# Patient Record
Sex: MALE | Race: WHITE | Employment: FULL TIME | URBAN - METROPOLITAN AREA
[De-identification: names, ages, dates, MRNs, and addresses within clinical notes are randomized per-mention and may not be internally consistent; named-entity substitution may affect disease eponyms.]

---

## 2017-05-10 ENCOUNTER — TRANSFERRED RECORDS (OUTPATIENT)
Dept: HEALTH INFORMATION MANAGEMENT | Facility: CLINIC | Age: 67
End: 2017-05-10

## 2017-05-17 ENCOUNTER — TRANSFERRED RECORDS (OUTPATIENT)
Dept: HEALTH INFORMATION MANAGEMENT | Facility: CLINIC | Age: 67
End: 2017-05-17

## 2017-05-23 ENCOUNTER — HOSPITAL ENCOUNTER (OUTPATIENT)
Dept: PHYSICAL THERAPY | Facility: CLINIC | Age: 67
Setting detail: THERAPIES SERIES
End: 2017-05-23
Attending: PHYSICIAN ASSISTANT
Payer: COMMERCIAL

## 2017-05-23 PROCEDURE — 97162 PT EVAL MOD COMPLEX 30 MIN: CPT | Mod: GP | Performed by: PHYSICAL THERAPIST

## 2017-05-23 PROCEDURE — 97140 MANUAL THERAPY 1/> REGIONS: CPT | Mod: GP | Performed by: PHYSICAL THERAPIST

## 2017-05-23 PROCEDURE — 40000718 ZZHC STATISTIC PT DEPARTMENT ORTHO VISIT: Performed by: PHYSICAL THERAPIST

## 2017-05-23 PROCEDURE — 97110 THERAPEUTIC EXERCISES: CPT | Mod: GP | Performed by: PHYSICAL THERAPIST

## 2017-05-23 NOTE — PROGRESS NOTES
05/23/17 0900   General Information   Type of Visit Initial OP Ortho PT Evaluation   Start of Care Date 05/23/17   Referring Physician Dr. Yuli Perez   Patient/Family Goals Statement To reduce my level of pain   Orders Evaluate and Treat   Date of Order 05/01/17   Insurance Type Other  ([facility] choice)   Medical Diagnosis R hip and B knee pain   Body Part(s)   Body Part(s) Knee   Presentation and Etiology   Pertinent history of current problem (include personal factors and/or comorbidities that impact the POC) Pt presents w/ B knee pain X 30 years.  Pain @ best 5/10,  @ worst 8/10 around the whole knee.  Knee xrays 2016:  mild arthritis.  2nd complaints: R hip pain X 1 year---no specific injury.  Pain @ best 3/10, @ worst 8-9/10 lateral hip and when it gets worse moves to the back.  Xray recently: deg changes in spine, neg at hip.   Meds: naproxen.   PMHX:  arthritis,  L shoulder decompression.  Ongoing LBP since 1972--notes he has limited mobility---no surgeries.   Current smoker.   Moderate complexity: job tasks of being down on the floor   Impairments A. Pain;E. Decreased flexibility;H. Impaired gait   Pain quality B. Dull;C. Aching   Pain exacerbation comment Knees:  Kneeling, squatting,  walking > 1/4 mile.   Initial steps after sitting bother hip > knees.   Hip:  Sitting 5-10 min especially in the car.   Will note sharp pain that wakes him.     Pain/symptoms eased by B. Walking;I. OTC medication(s)   Progression of symptoms since onset: Worsened  (new hip pain)   Prior Level of Function   Functional Level Prior Comment Waking 4-5X / night.  Walking at most 1/2 mile for many years.   Current Level of Function   Patient role/employment history F. 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 No   Per patient - Fall with injury in past year? No   Is patient a fall risk? No   Knee Objective  "Findings   Observation genu varum   Posture R PSIS/ crest lower, L LE longer   Gait/Locomotion guarded, mild stiffness.  No limp   Foot Position In Standing increased pronation   Knee ROM Comment LROM flex 40% , SB R 60%, L 40% w/ back tightness.  ER PROM R 23*, L 20*;  IR R 23*, L 25*   Knee/Hip Strength Comments Hip flex R 4 to 4+/5, L 5/5;  Hip ext B 4-/5   Step Test Height Control Comment 6\" step up test knee in position noted.    Anterior Drawer Test neg   Posterior Drawer Test neg   Varus Stress Test neg   Valgus Stress Test neg   Knee Special Test Comments Difficulty assessing FB test due to limited trunk mobility.  Pt notes achying R hip w/ scour,  tightness B hip w/ FADIR.  tightness and pain w/ ABDIRAHMAN R > L    Palpation Negative at knees and hip, mild tenderness R ITBand.   Right Knee Extension AROM B WNL   Right Knee Flexion AROM B WNL   Right Knee Flexion Strength B 5/5   Right Knee Extension Strength B 5/5   Right Hip Abduction Strength R 4-/5, L 4+/5   Right Gastrocnemius Flexibility B 25%   Right Hamstring Flexibility Moderate + tightness B    Right Quadricep Flexibility R mod tightness, L mod + tightness   Right ITB Flexibility R mild + tight, L neg   Planned Therapy Interventions   Planned Therapy Interventions balance training;manual therapy;neuromuscular re-education;ROM;strengthening;stretching   Clinical Impression   Criteria for Skilled Therapeutic Interventions Met yes, treatment indicated   PT Diagnosis R hip and B knee pain   Influenced by the following impairments pain, stiffness, decrased strength   Functional limitations due to impairments walking, sitting ,kneeling   Clinical Presentation Evolving/Changing   Clinical Presentation Rationale chronic knee pain w/ new onset of hip pain   Clinical Decision Making (Complexity) Moderate complexity   Therapy Frequency 1 time/week   Predicted Duration of Therapy Intervention (days/wks) 6 weeks   Risk & Benefits of therapy have been explained Yes "   Patient, Family & other staff in agreement with plan of care Yes   Education Assessment   Barriers to Learning No barriers   Ortho Goal 1   Goal Description 1.  Pt will report increased ease w/ initial walking after sitting   Target Date 06/22/17   Ortho Goal 2   Goal Description 2.  Pt will be able to walk 1/2 mile w/ pain no > 4/10   Target Date 07/22/17   Ortho Goal 3   Goal Description 3.  Pt will tolerate sitting in car X 20 min w/ hip pain no > 4/10   Target Date 07/22/17   Ortho Goal 4   Goal Description 4. Independent and consistent w/ HEP   Target Date 07/22/17   Total Evaluation Time   Total Evaluation Time 30     Thank you for this referral,    Kamala Victor, PT,  CEAS   #[id number]  [facility]  [phone]

## 2017-06-06 ENCOUNTER — HOSPITAL ENCOUNTER (OUTPATIENT)
Dept: PHYSICAL THERAPY | Facility: CLINIC | Age: 67
Setting detail: THERAPIES SERIES
End: 2017-06-06
Attending: PHYSICIAN ASSISTANT
Payer: COMMERCIAL

## 2017-06-06 PROCEDURE — 97110 THERAPEUTIC EXERCISES: CPT | Mod: GP | Performed by: PHYSICAL THERAPIST

## 2017-06-06 PROCEDURE — 97140 MANUAL THERAPY 1/> REGIONS: CPT | Mod: GP | Performed by: PHYSICAL THERAPIST

## 2017-06-06 PROCEDURE — 40000718 ZZHC STATISTIC PT DEPARTMENT ORTHO VISIT: Performed by: PHYSICAL THERAPIST

## 2017-06-13 ENCOUNTER — HOSPITAL ENCOUNTER (OUTPATIENT)
Dept: PHYSICAL THERAPY | Facility: CLINIC | Age: 67
Setting detail: THERAPIES SERIES
End: 2017-06-13
Attending: PHYSICIAN ASSISTANT
Payer: COMMERCIAL

## 2017-06-13 PROCEDURE — 40000718 ZZHC STATISTIC PT DEPARTMENT ORTHO VISIT: Performed by: PHYSICAL THERAPIST

## 2017-06-13 PROCEDURE — 97140 MANUAL THERAPY 1/> REGIONS: CPT | Mod: GP | Performed by: PHYSICAL THERAPIST

## 2017-06-13 PROCEDURE — 97110 THERAPEUTIC EXERCISES: CPT | Mod: GP | Performed by: PHYSICAL THERAPIST

## 2017-06-26 ENCOUNTER — TRANSFERRED RECORDS (OUTPATIENT)
Dept: HEALTH INFORMATION MANAGEMENT | Facility: CLINIC | Age: 67
End: 2017-06-26

## 2017-06-26 ENCOUNTER — HOSPITAL ENCOUNTER (OUTPATIENT)
Dept: PHYSICAL THERAPY | Facility: CLINIC | Age: 67
Setting detail: THERAPIES SERIES
End: 2017-06-26
Attending: PHYSICIAN ASSISTANT
Payer: COMMERCIAL

## 2017-06-26 PROCEDURE — 97140 MANUAL THERAPY 1/> REGIONS: CPT | Mod: GP | Performed by: PHYSICAL THERAPIST

## 2017-06-26 PROCEDURE — 97110 THERAPEUTIC EXERCISES: CPT | Mod: GP | Performed by: PHYSICAL THERAPIST

## 2017-06-26 PROCEDURE — 40000718 ZZHC STATISTIC PT DEPARTMENT ORTHO VISIT: Performed by: PHYSICAL THERAPIST

## 2017-06-29 ENCOUNTER — MEDICAL CORRESPONDENCE (OUTPATIENT)
Dept: HEALTH INFORMATION MANAGEMENT | Facility: CLINIC | Age: 67
End: 2017-06-29

## 2017-06-30 ENCOUNTER — ONCOLOGY VISIT (OUTPATIENT)
Dept: ONCOLOGY | Facility: CLINIC | Age: 67
End: 2017-06-30
Attending: INTERNAL MEDICINE
Payer: COMMERCIAL

## 2017-06-30 ENCOUNTER — TRANSFERRED RECORDS (OUTPATIENT)
Dept: HEALTH INFORMATION MANAGEMENT | Facility: CLINIC | Age: 67
End: 2017-06-30

## 2017-06-30 VITALS
RESPIRATION RATE: 17 BRPM | HEIGHT: 69 IN | TEMPERATURE: 98.6 F | OXYGEN SATURATION: 96 % | DIASTOLIC BLOOD PRESSURE: 77 MMHG | WEIGHT: 163.8 LBS | BODY MASS INDEX: 24.26 KG/M2 | SYSTOLIC BLOOD PRESSURE: 129 MMHG | HEART RATE: 68 BPM

## 2017-06-30 DIAGNOSIS — C69.92 OCULAR MELANOMA, LEFT (H): Primary | ICD-10-CM

## 2017-06-30 PROCEDURE — 99212 OFFICE O/P EST SF 10 MIN: CPT | Mod: ZF

## 2017-06-30 PROCEDURE — 99204 OFFICE O/P NEW MOD 45 MIN: CPT | Mod: ZP | Performed by: INTERNAL MEDICINE

## 2017-06-30 ASSESSMENT — PAIN SCALES - GENERAL: PAINLEVEL: NO PAIN (0)

## 2017-06-30 NOTE — MR AVS SNAPSHOT
After Visit Summary   6/30/2017    Jake Araiza    MRN: [id number]           Patient Information     Date Of Birth          1950        Visit Information        Provider Department      6/30/2017 3:00 PM Asa Ford MD [facility]        Today's Diagnoses     Ocular melanoma, left (H)    -  1       Follow-ups after your visit        Follow-up notes from your care team     Return in about 2 years (around 6/30/2019).      Your next 10 appointments already scheduled     Aug 01, 2017  7:30 AM CDT   Ortho Treatment with Kamala Victor, SISI   [facility] ([facility])    [address]  [address]  [facility] [place] [address]   [phone]            Aug 24, 2017   Procedure with Miracle Luna MD   [facility] (--)    [address]  [facility] [address]   [phone]            Aug 28, 2017   Procedure with Miracle Luna MD   [facility] (--)    [address]  [facility] [address]   [phone]            Oct 04, 2017 10:00 AM CDT   PET ONCOLOGY WHOLE BODY with WYPETCT1   [facility] Pet CT ([facility])    [address]  [facility] [place] [address]   [phone]           Tell your doctor:   If there is any chance you may be pregnant or if you are breastfeeding.   If you have problems lying in small spaces (claustrophobia). If you do, your doctor may give you medicine to help you relax. If you have diabetes:   Have your exam early in the morning. Your blood glucose will go up as the day goes by.   Your glucose level must be 180 or less at the start of the exam. Please take any medicines you need to ensure this blood glucose level. 24 hours before your scan: Don t do any heavy exercise. (No jogging, aerobics or other workouts.) Exercise will make your pictures less accurate. 6 hours before your scan:   Stop all food and  "liquids (except water).   Do not chew gum or suck on mints.   If you need to take medicine with food, you may take it with a few crackers.  Please call your Imaging Department at your exam site with any questions.            Oct 12, 2017  3:30 PM CDT   [facility] Draw with  [facility] LAB DRAW   [facility] Draw ([facility])    [address] [place] [address] [phone]            Oct 12, 2017  4:00 PM CDT   (Arrive by 3:45 PM)   Return Visit with Asa Mcintosh MD   [facility] ([facility])    [address] [place] [address] [phone]              Who to contact     If you have questions or need follow up information about today's clinic visit or your schedule please contact [facility] directly at [phone].  Normal or non-critical lab and imaging results will be communicated to you by [employer], letter or phone within 4 business days after the clinic has received the results. If you do not hear from us within 7 days, please contact the clinic through [employer] or phone. If you have a critical or abnormal lab result, we will notify you by phone as soon as possible.  Submit refill requests through [employer] or call your pharmacy and they will forward the refill request to us. Please allow 3 business days for your refill to be completed.          Additional Information About Your Visit        [employer]do Information     [employer]do lets you send messages to your doctor, view your test results, renew your prescriptions, schedule appointments and more. To sign up, go to [email or web address] . Click on \"Log in\" on the left side of the screen, which will take you to the Welcome page. Then click on \"Sign up Now\" on the right side of the page.     You will be asked to enter the access code listed below, as well as some personal information. Please follow the " "directions to create your username and password.     Your access code is: [email or web address]  Expires: 2017  9:26 AM     Your access code will  in 90 days. If you need help or a new code, please call your [place] clinic or [phone].        Care EveryWhere ID     This is your Care EveryWhere ID. This could be used by other organizations to access your [place] medical records  [id number]        Your Vitals Were     Pulse Temperature Respirations Height Pulse Oximetry BMI (Body Mass Index)    68 98.6  F (37  C) (Oral) 17 1.753 m (5' 9\") 96% 24.19 kg/m2       Blood Pressure from Last 3 Encounters:   07/10/17 138/85   17 129/77   13 145/86    Weight from Last 3 Encounters:   07/10/17 73.4 kg (161 lb 12.8 oz)   17 74.3 kg (163 lb 12.8 oz)   13 73.5 kg (162 lb)               Primary Care Provider Office Phone # Fax #    R Jean Carlos Jaimes [phone]       [place] [address]        Equal Access to Services     ART CORREA AH: Hadii tracy ku hadasho Soomaali, waaxda luqadaha, qaybta kaalmada adeegyada, waxay idiin haykristien sherie noel . So [facility] [phone].    ATENCIÓN: Si habla español, tiene a waterman disposición servicios gratuitos de asistencia lingüística. giuliano al [phone].    We comply with applicable federal civil rights laws and [place] laws. We do not discriminate on the basis of race, color, national origin, age, disability sex, sexual orientation or gender identity.            Thank you!     Thank you for choosing [facility]  for your care. Our goal is always to provide you with excellent care. Hearing back from our patients is one way we can continue to improve our services. Please take a few minutes to complete the written survey that you may receive in the mail after your visit with us. Thank you!             Your Updated Medication List - Protect others around you: Learn how to safely " use, store and throw away your medicines at www.disposemymeds.org.          This list is accurate as of: 6/30/17 11:59 PM.  Always use your most recent med list.                   Brand Name Dispense Instructions for use Diagnosis    ALEVE 220 MG capsule   Generic drug:  naproxen sodium      Take 220 mg by mouth daily as needed.

## 2017-06-30 NOTE — NURSING NOTE
"Oncology Rooming Note    June 30, 2017 2:43 PM   Jake Araiza is a 67 year old male who presents for:    Chief Complaint   Patient presents with     Oncology Clinic Visit     new patient consultation for chroidal melanoma      Initial Vitals: /77  Pulse 68  Temp 98.6  F (37  C) (Oral)  Resp 17  Ht 1.753 m (5' 9\")  Wt 74.3 kg (163 lb 12.8 oz)  SpO2 96%  BMI 24.19 kg/m2 Estimated body mass index is 24.19 kg/(m^2) as calculated from the following:    Height as of this encounter: 1.753 m (5' 9\").    Weight as of this encounter: 74.3 kg (163 lb 12.8 oz). Body surface area is 1.9 meters squared.  No Pain (0) Comment: Data Unavailable   No LMP for male patient.  Allergies reviewed: Yes  Medications reviewed: Yes    Medications: Medication refills not needed today.  Pharmacy name entered into [employer]: [facility] [place] [id number] [address]    Clinical concerns: none dr. sanchez  was notified.    5 minutes for nursing intake (face to face time)     Marry Johnson CMA              "

## 2017-06-30 NOTE — LETTER
6/30/2017       RE: Jake Araiza  [address] [address] [place] [id number]     Dear Colleague,    Thank you for referring your patient, Jake Araiza, to the [facility]. Please see a copy of my visit note below.    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 He was seen in the [facility] [place], under Dr. Luna and Dr. Garsia in radiation oncology. Plan is for plaque brachytherapy with I-125.    Currently, patient is asymptomatic and reports only blurred vision, but no flashes or floaters. He's had no vision loss, pain or ocular pressure. No loss of appetite, weight loss, abdominal pain, nausea, vomiting, or diarrhea. He has a family history of leukemia, but no relatives with ocular or cutaneous melanoma. He is a currently half-pack per day smoker. Performance status is 0.    Results for orders placed or performed in visit on 11/11/13   XR Lumbar Spine 2/3 Views    Narrative    LUMBAR SPINE TWO TO THREE VIEWS 11/11/2013 3:12 PM     HISTORY: Lumbago.     FINDINGS: Mild degenerative disease changes at L2-L3 as well as mild  retrolisthesis. There is mild spurring at L3-L4 and L4-L5.    JODIE BRIONES MD       REVIEW OF SYSTEMS  A 12-point ROS negative except as in HPI    Current Outpatient Prescriptions   Medication Sig Dispense Refill     naproxen sodium (ALEVE) 220 MG capsule Take 220 mg by mouth daily as needed.       UNABLE TO FIND Place 1 drop Into the left eye 2 times daily Pt is taking anti-pressure eyedrops, but does not remember the name.         Immunization History   Administered Date(s) Administered     TD (ADULT, 7+) 10/25/1999     TDAP Vaccine (Adacel) 01/29/2009       Past Surgical History:   Procedure Laterality Date     ARTHROSCOPY SHOULDER RT/LT  1/07    decompression left shoulder     COLONOSCOPY   "12/23/2004    Colonoscopy and cautery of polyps recommend followup in 10 years.     VASECTOMY  1989       SOCIAL HISTORY  History   Smoking Status     Current Some Day Smoker     Packs/day: 0.50     Years: 30.00     Types: Cigarettes   Smokeless Tobacco     Never Used      Alcohol use Yes   Comment: weekend between 2-10 beers    History   Drug Use No       FAMILY HISTORY  Family History   Problem Relation Age of Onset     CANCER Father      leukemia     DIABETES Maternal Grandmother      DIABETES Paternal Grandmother      Cardiovascular Mother      atrial fib, RVR, age 79       PHYSICAL EXAMINATION  /77  Pulse 68  Temp 98.6  F (37  C) (Oral)  Resp 17  Ht 1.753 m (5' 9\")  Wt 74.3 kg (163 lb 12.8 oz)  SpO2 96%  BMI 24.19 kg/m2  Wt Readings from Last 2 Encounters:   07/10/17 73.4 kg (161 lb 12.8 oz)   06/30/17 74.3 kg (163 lb 12.8 oz)     Physical Exam   Constitutional: He is oriented to person, place, and time. He appears well-developed and well-nourished. No distress.   HENT:   Mouth/Throat: Oropharynx is clear and moist.   Eyes: Conjunctivae are normal. Pupils are equal, round, and reactive to light. No scleral icterus.   Neck: Normal range of motion.   Cardiovascular: Normal heart sounds.    Pulmonary/Chest: Effort normal and breath sounds normal.   Abdominal: There is no tenderness.   Musculoskeletal: Normal range of motion.   Lymphadenopathy:     He has no cervical adenopathy.   Neurological: He is alert and oriented to person, place, and time. No cranial nerve deficit.   Skin: Skin is warm and dry. No rash noted.   Psychiatric: He has a normal mood and affect. His behavior is normal.   Nursing note and vitals reviewed.      IMPRESSION AND PLAN  #1 Choroidal melanoma, left eye  It was a pleasure to meet Mr. Araiza today. He is a very pleasant 67 year old gentleman with an ocular melanoma amenable to plaque brachytherapy. He is referred by Dr. Luna for multidisciplinary management.    I reviewed the " natural history of ocular melanoma and the need for local control to prevent metastatic spread and progressive disease. Patient fortunately is asymptomatic with regards to the primary lesion and there are no worrisome signs of metastatic involvement. However, we will obtain imaging of the liver to include MRI liver, given sensitivity is greater than CT-abdomen, and MRI brain. If these are negative, which I anticipate, he would be an excellent candidate for plaque brachytherapy.    Multiple questions answered. I will plan to see him back to review results of restaging studies. Thank you for the consultation.    Asa Fish M.D.   of Medicine  Hematology, Oncology and Transplantation  [facility]    Ocular melanoma, left (H)  - MRI Liver  w & w/o contrast*  - MR Brain w/o & w Contrast    Again, thank you for allowing me to participate in the care of your patient.      Sincerely,    Asa Mcintosh MD

## 2017-07-05 DIAGNOSIS — I73.9 PERIPHERAL VASCULAR DISEASE (H): Primary | ICD-10-CM

## 2017-07-05 DIAGNOSIS — C69.30 CHOROIDAL MALIGNANT MELANOMA (H): ICD-10-CM

## 2017-07-08 ENCOUNTER — HOSPITAL ENCOUNTER (OUTPATIENT)
Dept: MRI IMAGING | Facility: CLINIC | Age: 67
End: 2017-07-08
Attending: INTERNAL MEDICINE
Payer: COMMERCIAL

## 2017-07-08 ENCOUNTER — HOSPITAL ENCOUNTER (OUTPATIENT)
Dept: MRI IMAGING | Facility: CLINIC | Age: 67
Discharge: HOME | End: 2017-07-08
Attending: INTERNAL MEDICINE | Admitting: INTERNAL MEDICINE
Payer: COMMERCIAL

## 2017-07-08 ENCOUNTER — HEALTH MAINTENANCE LETTER (OUTPATIENT)
Age: 67
End: 2017-07-08

## 2017-07-08 DIAGNOSIS — C69.92 OCULAR MELANOMA, LEFT (H): ICD-10-CM

## 2017-07-08 PROCEDURE — A9585 GADOBUTROL INJECTION: HCPCS | Performed by: INTERNAL MEDICINE

## 2017-07-08 PROCEDURE — 25000128 H RX IP 250 OP 636: Performed by: INTERNAL MEDICINE

## 2017-07-08 PROCEDURE — 70553 MRI BRAIN STEM W/O & W/DYE: CPT

## 2017-07-08 PROCEDURE — 27210995 ZZH RX 272: Performed by: INTERNAL MEDICINE

## 2017-07-08 PROCEDURE — 74183 MRI ABD W/O CNTR FLWD CNTR: CPT

## 2017-07-08 RX ORDER — GADOBUTROL 604.72 MG/ML
10 INJECTION INTRAVENOUS ONCE
Status: COMPLETED | OUTPATIENT
Start: 2017-07-08 | End: 2017-07-08

## 2017-07-08 RX ORDER — ACYCLOVIR 200 MG/1
60 CAPSULE ORAL ONCE
Status: COMPLETED | OUTPATIENT
Start: 2017-07-08 | End: 2017-07-08

## 2017-07-08 RX ADMIN — SODIUM CHLORIDE 60 ML: 9 INJECTION, SOLUTION INTRAMUSCULAR; INTRAVENOUS; SUBCUTANEOUS at 08:15

## 2017-07-08 RX ADMIN — GADOBUTROL 10 ML: 604.72 INJECTION INTRAVENOUS at 08:09

## 2017-07-10 ENCOUNTER — ONCOLOGY VISIT (OUTPATIENT)
Dept: ONCOLOGY | Facility: CLINIC | Age: 67
End: 2017-07-10
Attending: INTERNAL MEDICINE
Payer: COMMERCIAL

## 2017-07-10 ENCOUNTER — APPOINTMENT (OUTPATIENT)
Dept: LAB | Facility: CLINIC | Age: 67
End: 2017-07-10
Attending: INTERNAL MEDICINE
Payer: COMMERCIAL

## 2017-07-10 VITALS
HEART RATE: 69 BPM | SYSTOLIC BLOOD PRESSURE: 138 MMHG | BODY MASS INDEX: 23.96 KG/M2 | HEIGHT: 69 IN | RESPIRATION RATE: 16 BRPM | TEMPERATURE: 98.5 F | OXYGEN SATURATION: 97 % | WEIGHT: 161.8 LBS | DIASTOLIC BLOOD PRESSURE: 85 MMHG

## 2017-07-10 DIAGNOSIS — I73.9 PERIPHERAL VASCULAR DISEASE (H): ICD-10-CM

## 2017-07-10 DIAGNOSIS — C69.30 CHOROIDAL MALIGNANT MELANOMA (H): ICD-10-CM

## 2017-07-10 LAB
ALBUMIN SERPL-MCNC: 4.1 G/DL (ref 3.4–5)
ALP SERPL-CCNC: 57 U/L (ref 40–150)
ALT SERPL W P-5'-P-CCNC: 29 U/L (ref 0–70)
ANION GAP SERPL CALCULATED.3IONS-SCNC: 6 MMOL/L (ref 3–14)
AST SERPL W P-5'-P-CCNC: 16 U/L (ref 0–45)
BASOPHILS # BLD AUTO: 0.1 10E9/L (ref 0–0.2)
BASOPHILS NFR BLD AUTO: 0.8 %
BILIRUB SERPL-MCNC: 1 MG/DL (ref 0.2–1.3)
BUN SERPL-MCNC: 13 MG/DL (ref 7–30)
CALCIUM SERPL-MCNC: 8.8 MG/DL (ref 8.5–10.1)
CHLORIDE SERPL-SCNC: 106 MMOL/L (ref 94–109)
CO2 SERPL-SCNC: 24 MMOL/L (ref 20–32)
CREAT SERPL-MCNC: 0.94 MG/DL (ref 0.66–1.25)
DIFFERENTIAL METHOD BLD: NORMAL
EOSINOPHIL # BLD AUTO: 0.1 10E9/L (ref 0–0.7)
EOSINOPHIL NFR BLD AUTO: 1.4 %
ERYTHROCYTE [DISTWIDTH] IN BLOOD BY AUTOMATED COUNT: 13.4 % (ref 10–15)
GFR SERPL CREATININE-BSD FRML MDRD: 81 ML/MIN/1.7M2
GLUCOSE SERPL-MCNC: 106 MG/DL (ref 70–99)
HCT VFR BLD AUTO: 47.2 % (ref 40–53)
HGB BLD-MCNC: 16.1 G/DL (ref 13.3–17.7)
IMM GRANULOCYTES # BLD: 0 10E9/L (ref 0–0.4)
IMM GRANULOCYTES NFR BLD: 0.5 %
LYMPHOCYTES # BLD AUTO: 2.2 10E9/L (ref 0.8–5.3)
LYMPHOCYTES NFR BLD AUTO: 33.1 %
MAGNESIUM SERPL-MCNC: 2.2 MG/DL (ref 1.6–2.3)
MCH RBC QN AUTO: 32.3 PG (ref 26.5–33)
MCHC RBC AUTO-ENTMCNC: 34.1 G/DL (ref 31.5–36.5)
MCV RBC AUTO: 95 FL (ref 78–100)
MONOCYTES # BLD AUTO: 0.4 10E9/L (ref 0–1.3)
MONOCYTES NFR BLD AUTO: 5.7 %
NEUTROPHILS # BLD AUTO: 3.8 10E9/L (ref 1.6–8.3)
NEUTROPHILS NFR BLD AUTO: 58.5 %
NRBC # BLD AUTO: 0 10*3/UL
NRBC BLD AUTO-RTO: 0 /100
PLATELET # BLD AUTO: 235 10E9/L (ref 150–450)
POTASSIUM SERPL-SCNC: 4.2 MMOL/L (ref 3.4–5.3)
PROT SERPL-MCNC: 6.9 G/DL (ref 6.8–8.8)
RBC # BLD AUTO: 4.98 10E12/L (ref 4.4–5.9)
SODIUM SERPL-SCNC: 137 MMOL/L (ref 133–144)
WBC # BLD AUTO: 6.5 10E9/L (ref 4–11)

## 2017-07-10 PROCEDURE — 85025 COMPLETE CBC W/AUTO DIFF WBC: CPT | Performed by: INTERNAL MEDICINE

## 2017-07-10 PROCEDURE — 83735 ASSAY OF MAGNESIUM: CPT | Performed by: INTERNAL MEDICINE

## 2017-07-10 PROCEDURE — 99212 OFFICE O/P EST SF 10 MIN: CPT | Mod: ZF

## 2017-07-10 PROCEDURE — 80053 COMPREHEN METABOLIC PANEL: CPT | Performed by: INTERNAL MEDICINE

## 2017-07-10 PROCEDURE — 36415 COLL VENOUS BLD VENIPUNCTURE: CPT

## 2017-07-10 PROCEDURE — 99214 OFFICE O/P EST MOD 30 MIN: CPT | Mod: ZP | Performed by: INTERNAL MEDICINE

## 2017-07-10 PROCEDURE — 36415 COLL VENOUS BLD VENIPUNCTURE: CPT | Performed by: INTERNAL MEDICINE

## 2017-07-10 ASSESSMENT — PAIN SCALES - GENERAL: PAINLEVEL: NO PAIN (0)

## 2017-07-10 NOTE — LETTER
"7/10/2017       RE: Jake Araiza  [address] [address] [place] [id number]     Dear Colleague,    Thank you for referring your patient, Jake Araiza, to the [facility]. Please see a copy of my visit note below.    ONCOLOGY PROGRESS NOTE  Jul 10, 2017    HISTORY OF PRESENT ILLNESS  Mr. 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 He was seen in the [facility], under Dr. Luna and Dr. Garsia in radiation oncology. Plan is for plaque brachytherapy with I-125.    He had MRI brain and MRI liver on 7/8/17 for restaging, and both the brain and liver are negative for evidence of metastatic disease. Patient continues to be largely asymptomatic. He reports no changes in his vision, other than some blurriness. No flashes or floaters. No vision loss, pain or pressure. No loss of appetite, weight loss, abdominal pain, nausea, vomiting, or diarrhea. Labs including complete blood count, renal function, and liver function tests are normal. Performance status is 0.    Current Outpatient Prescriptions   Medication Sig Dispense Refill     UNABLE TO FIND Place 1 drop Into the left eye 2 times daily Pt is taking anti-pressure eyedrops, but does not remember the name.       naproxen sodium (ALEVE) 220 MG capsule Take 220 mg by mouth daily as needed.         REVIEW OF SYSTEMS  12-point ROS negative except as in HPI    PHYSICAL EXAMINATION  /85  Pulse 69  Temp 98.5  F (36.9  C) (Oral)  Resp 16  Ht 1.753 m (5' 9.02\")  Wt 73.4 kg (161 lb 12.8 oz)  SpO2 97%  BMI 23.88 kg/m2  Wt Readings from Last 2 Encounters:   07/10/17 73.4 kg (161 lb 12.8 oz)   06/30/17 74.3 kg (163 lb 12.8 oz)     Physical Exam    IMPRESSION AND PLAN  #1 Choroidal melanoma, left eye  Mr. Araiza presents today for follow-up and restaging studies. We obtained liver MRI as well as MRI brain, and both are negative for metastatic disease. I believe he would be a good candidate " for plaque brachytherapy as is being planned by Dr. Luna and Dr. Garsia.    I would plan to see him back in about 3 months with restaging CT-CAP to continue to follow-up for possibility of metastatic disease. He understands that observation imaging will be needed over the next 3-5 years to ensure the plaque brachytherapy treatment was successful.    Multiple questions answered. I will see him back in 3 months.    Asa Fish M.D.   of Medicine  Hematology, Oncology and Transplantation  [facility]    Choroidal malignant melanoma (H)  - CBC with platelets differential  - Comprehensive metabolic panel  - Magnesium        Again, thank you for allowing me to participate in the care of your patient.      Sincerely,    Asa Mcintosh MD

## 2017-07-10 NOTE — MR AVS SNAPSHOT
After Visit Summary   7/10/2017    Jake Araiza    MRN: [id number]           Patient Information     Date Of Birth          1950        Visit Information        Provider Department      7/10/2017 2:30 PM Asa Ford MD [facility]        Today's Diagnoses     Peripheral vascular disease (H)        Choroidal malignant melanoma (H)           Follow-ups after your visit        Follow-up notes from your care team     Return in about 3 months (around 10/10/2017).      Your next 10 appointments already scheduled     Jul 18, 2017  7:30 AM CDT   Ortho Treatment with Kamala Victor PT   [facility] ([facility])    [address]  [address]  [facility] [address]   [phone]            Aug 24, 2017   Procedure with Miracle Luna MD   [facility] (--)    [address]  [facility] [address]   [phone]            Aug 28, 2017   Procedure with Miracle Luna MD   [facility] (--)    [address]  [facility] [address]   [phone]            Oct 12, 2017  3:30 PM CDT   [facility] Draw with  [facility]   [facility] Draw ([facility])    [address] [place] [id number] [phone]            Oct 12, 2017  4:00 PM CDT   (Arrive by 3:45 PM)   Return Visit with Asa Mcintosh MD   [facility] ([facility])    [address] [place] [address] [phone]              Who to contact     If you have questions or need follow up information about today's clinic visit or your schedule please contact [facility] directly at [phone].  Normal or non-critical lab and imaging results will be communicated to you by MyChart, letter or phone within 4 business days after the clinic has  "received the results. If you do not hear from us within 7 days, please contact the clinic through [employer] or phone. If you have a critical or abnormal lab result, we will notify you by phone as soon as possible.  Submit refill requests through [employer] or call your pharmacy and they will forward the refill request to us. Please allow 3 business days for your refill to be completed.          Additional Information About Your Visit        [employer] Information     [employer] lets you send messages to your doctor, view your test results, renew your prescriptions, schedule appointments and more. To sign up, go to [email or web address] . Click on \"Log in\" on the left side of the screen, which will take you to the Welcome page. Then click on \"Sign up Now\" on the right side of the page.     You will be asked to enter the access code listed below, as well as some personal information. Please follow the directions to create your username and password.     Your access code is: [email or web address]  Expires: 2017  9:26 AM     Your access code will  in 90 days. If you need help or a new code, please call your [facility] clinic or [phone].        Care EveryWhere ID     This is your Care EveryWhere ID. This could be used by other organizations to access your [facility] medical records  [id number]        Your Vitals Were     Pulse Temperature Respirations Height Pulse Oximetry BMI (Body Mass Index)    69 98.5  F (36.9  C) (Oral) 16 1.753 m (5' 9.02\") 97% 23.88 kg/m2       Blood Pressure from Last 3 Encounters:   No data found for BP    Weight from Last 3 Encounters:   No data found for Wt              Today, you had the following     No orders found for display       Primary Care Provider Office Phone # Fax #    R Jean Carlos Jaimes [phone]       [address] [address]        Equal Access to Services     ART CORREA AH: Hadii tracy Michel, wadiane luqadamary, qaybta " vicente gascacarlin szymanski. So [place] -[phone].    ATENCIÓN: Si aravind martino, tiene a waterman disposición servicios gratuitos de asistencia lingüística. Angelica al [phone].    We comply with applicable federal civil rights laws and [place] laws. We do not discriminate on the basis of race, color, national origin, age, disability sex, sexual orientation or gender identity.            Thank you!     Thank you for choosing [facility]  for your care. Our goal is always to provide you with excellent care. Hearing back from our patients is one way we can continue to improve our services. Please take a few minutes to complete the written survey that you may receive in the mail after your visit with us. Thank you!             Your Updated Medication List - Protect others around you: Learn how to safely use, store and throw away your medicines at www.disposemymeds.org.          This list is accurate as of: 7/10/17 11:59 PM.  Always use your most recent med list.                   Brand Name Dispense Instructions for use Diagnosis    ALEVE 220 MG capsule   Generic drug:  naproxen sodium      Take 220 mg by mouth daily as needed.        UNABLE TO FIND      Place 1 drop Into the left eye 2 times daily Pt is taking anti-pressure eyedrops, but does not remember the name.

## 2017-07-10 NOTE — NURSING NOTE
"Oncology Rooming Note    July 10, 2017 2:24 PM   Jake Araiza is a 67 year old male who presents for:    Chief Complaint   Patient presents with     Labs Only     venipuncture, vitals checked     Oncology Clinic Visit     Chroidal Melanoma F/U     Initial Vitals: /85  Pulse 69  Temp 98.5  F (36.9  C) (Oral)  Resp 16  Ht 1.753 m (5' 9.02\")  Wt 73.4 kg (161 lb 12.8 oz)  SpO2 97%  BMI 23.88 kg/m2 Estimated body mass index is 23.88 kg/(m^2) as calculated from the following:    Height as of this encounter: 1.753 m (5' 9.02\").    Weight as of this encounter: 73.4 kg (161 lb 12.8 oz). Body surface area is 1.89 meters squared.  No Pain (0) Comment: Data Unavailable   No LMP for male patient.  Allergies reviewed: Yes  Medications reviewed: Yes    Medications: Medication refills not needed today.  Pharmacy name entered into [employer]: [facility] - [address]    Clinical concerns: None Dr Mcintosh was NOT notified.    7 minutes for nursing intake (face to face time)     Georgina Salazar LPN              "

## 2017-07-10 NOTE — NURSING NOTE
"Clinic Action Needed: Yes.     Reason for Call:Mother calling requesting a prescirption for \"Ranitidine\"  for \"Random\" red raised generlized rash.   States patient was prescribed Ranitidine by an allergist years ago and now the prescription has .   Mother inquiring if Dr. Rodriguez would prescribe a new prescription or does Mireya have to go to an allergist? States prescripiton was for Raniitidine 150 mg. One tablet twice a day by mouth. Denies any symptoms today.    MotherGail can be contacted at [phone]. OK to leave a message.     Pharmacy confirmed per [employer].     Patient Recommendations/Teaching:Please call back if you do not hear from clinic or any further concerns.     Routed to:RN Pool.     Thank you.     Shelly Zuniga RN [facility]         " Chief Complaint   Patient presents with     Labs Only     venipuncture, vitals checked

## 2017-07-13 DIAGNOSIS — C43.9 METASTATIC MELANOMA (H): Primary | ICD-10-CM

## 2017-07-14 NOTE — PROGRESS NOTES
"ONCOLOGY PROGRESS NOTE  Jul 10, 2017    HISTORY OF PRESENT ILLNESS  Mr. 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 He was seen in the [facility], under Dr. Luna and Dr. Garsia in radiation oncology. Plan is for plaque brachytherapy with I-125.    He had MRI brain and MRI liver on 7/8/17 for restaging, and both the brain and liver are negative for evidence of metastatic disease. Patient continues to be largely asymptomatic. He reports no changes in his vision, other than some blurriness. No flashes or floaters. No vision loss, pain or pressure. No loss of appetite, weight loss, abdominal pain, nausea, vomiting, or diarrhea. Labs including complete blood count, renal function, and liver function tests are normal. Performance status is 0.    Current Outpatient Prescriptions   Medication Sig Dispense Refill     UNABLE TO FIND Place 1 drop Into the left eye 2 times daily Pt is taking anti-pressure eyedrops, but does not remember the name.       naproxen sodium (ALEVE) 220 MG capsule Take 220 mg by mouth daily as needed.         REVIEW OF SYSTEMS  12-point ROS negative except as in HPI    PHYSICAL EXAMINATION  /85  Pulse 69  Temp 98.5  F (36.9  C) (Oral)  Resp 16  Ht 1.753 m (5' 9.02\")  Wt 73.4 kg (161 lb 12.8 oz)  SpO2 97%  BMI 23.88 kg/m2  Wt Readings from Last 2 Encounters:   07/10/17 73.4 kg (161 lb 12.8 oz)   06/30/17 74.3 kg (163 lb 12.8 oz)     Physical Exam    IMPRESSION AND PLAN  #1 Choroidal melanoma, left eye  Mr. Araiza presents today for follow-up and restaging studies. We obtained liver MRI as well as MRI brain, and both are negative for metastatic disease. I believe he would be a good candidate for plaque brachytherapy as is being planned by Dr. Luna and Dr. Garsia.    I would plan to see him back in about 3 months with restaging CT-CAP to continue to follow-up for possibility of metastatic disease. He understands that " observation imaging will be needed over the next 3-5 years to ensure the plaque brachytherapy treatment was successful.    Multiple questions answered. I will see him back in 3 months.    Asa Fish M.D.   of Medicine  Hematology, Oncology and Transplantation  [facility]    Choroidal malignant melanoma (H)  - CBC with platelets differential  - Comprehensive metabolic panel  - Magnesium

## 2017-07-18 ENCOUNTER — HOSPITAL ENCOUNTER (OUTPATIENT)
Dept: PHYSICAL THERAPY | Facility: CLINIC | Age: 67
Setting detail: THERAPIES SERIES
End: 2017-07-18
Attending: PHYSICIAN ASSISTANT
Payer: COMMERCIAL

## 2017-07-18 PROCEDURE — 40000718 ZZHC STATISTIC PT DEPARTMENT ORTHO VISIT: Performed by: PHYSICAL THERAPIST

## 2017-07-18 PROCEDURE — 97140 MANUAL THERAPY 1/> REGIONS: CPT | Mod: GP | Performed by: PHYSICAL THERAPIST

## 2017-07-18 PROCEDURE — 97110 THERAPEUTIC EXERCISES: CPT | Mod: GP | Performed by: PHYSICAL THERAPIST

## 2017-07-19 NOTE — PROGRESS NOTES
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 He was seen in the [facility], under Dr. Luna and Dr. Garsia in radiation oncology. Plan is for plaque brachytherapy with I-125.    Currently, patient is asymptomatic and reports only blurred vision, but no flashes or floaters. He's had no vision loss, pain or ocular pressure. No loss of appetite, weight loss, abdominal pain, nausea, vomiting, or diarrhea. He has a family history of leukemia, but no relatives with ocular or cutaneous melanoma. He is a currently half-pack per day smoker. Performance status is 0.    Results for orders placed or performed in visit on 11/11/13   XR Lumbar Spine 2/3 Views    Narrative    LUMBAR SPINE TWO TO THREE VIEWS 11/11/2013 3:12 PM     HISTORY: Lumbago.     FINDINGS: Mild degenerative disease changes at L2-L3 as well as mild  retrolisthesis. There is mild spurring at L3-L4 and L4-L5.    JODIE BRIONES MD       REVIEW OF SYSTEMS  A 12-point ROS negative except as in HPI    Current Outpatient Prescriptions   Medication Sig Dispense Refill     naproxen sodium (ALEVE) 220 MG capsule Take 220 mg by mouth daily as needed.       UNABLE TO FIND Place 1 drop Into the left eye 2 times daily Pt is taking anti-pressure eyedrops, but does not remember the name.         Immunization History   Administered Date(s) Administered     TD (ADULT, 7+) 10/25/1999     TDAP Vaccine (Adacel) 01/29/2009       Past Surgical History:   Procedure Laterality Date     ARTHROSCOPY SHOULDER RT/LT  1/07    decompression left shoulder     COLONOSCOPY  12/23/2004    Colonoscopy and cautery of polyps recommend followup in 10 years.     VASECTOMY  1989       SOCIAL HISTORY  History   Smoking Status     Current Some Day Smoker     Packs/day: 0.50     Years: 30.00     Types: Cigarettes  "  Smokeless Tobacco     Never Used      Alcohol use Yes   Comment: weekend between 2-10 beers    History   Drug Use No       FAMILY HISTORY  Family History   Problem Relation Age of Onset     CANCER Father      leukemia     DIABETES Maternal Grandmother      DIABETES Paternal Grandmother      Cardiovascular Mother      atrial fib, RVR, age 79       PHYSICAL EXAMINATION  /77  Pulse 68  Temp 98.6  F (37  C) (Oral)  Resp 17  Ht 1.753 m (5' 9\")  Wt 74.3 kg (163 lb 12.8 oz)  SpO2 96%  BMI 24.19 kg/m2  Wt Readings from Last 2 Encounters:   07/10/17 73.4 kg (161 lb 12.8 oz)   06/30/17 74.3 kg (163 lb 12.8 oz)     Physical Exam   Constitutional: He is oriented to person, place, and time. He appears well-developed and well-nourished. No distress.   HENT:   Mouth/Throat: Oropharynx is clear and moist.   Eyes: Conjunctivae are normal. Pupils are equal, round, and reactive to light. No scleral icterus.   Neck: Normal range of motion.   Cardiovascular: Normal heart sounds.    Pulmonary/Chest: Effort normal and breath sounds normal.   Abdominal: There is no tenderness.   Musculoskeletal: Normal range of motion.   Lymphadenopathy:     He has no cervical adenopathy.   Neurological: He is alert and oriented to person, place, and time. No cranial nerve deficit.   Skin: Skin is warm and dry. No rash noted.   Psychiatric: He has a normal mood and affect. His behavior is normal.   Nursing note and vitals reviewed.      IMPRESSION AND PLAN  #1 Choroidal melanoma, left eye  It was a pleasure to meet Mr. Araiza today. He is a very pleasant 67 year old gentleman with an ocular melanoma amenable to plaque brachytherapy. He is referred by Dr. Luna for multidisciplinary management.    I reviewed the natural history of ocular melanoma and the need for local control to prevent metastatic spread and progressive disease. Patient fortunately is asymptomatic with regards to the primary lesion and there are no worrisome signs of " metastatic involvement. However, we will obtain imaging of the liver to include MRI liver, given sensitivity is greater than CT-abdomen, and MRI brain. If these are negative, which I anticipate, he would be an excellent candidate for plaque brachytherapy.    Multiple questions answered. I will plan to see him back to review results of restaging studies. Thank you for the consultation.    Asa Fish M.D.   of Medicine  Hematology, Oncology and Transplantation  [facility]    Ocular melanoma, left (H)  - MRI Liver  w & w/o contrast*  - MR Brain w/o & w Contrast

## 2017-07-27 RX ORDER — CYCLOPENTOLATE HYDROCHLORIDE 10 MG/ML
1 SOLUTION/ DROPS OPHTHALMIC
Status: CANCELLED | OUTPATIENT
Start: 2017-07-27

## 2017-07-27 RX ORDER — SODIUM CHLORIDE, SODIUM LACTATE, POTASSIUM CHLORIDE, CALCIUM CHLORIDE 600; 310; 30; 20 MG/100ML; MG/100ML; MG/100ML; MG/100ML
INJECTION, SOLUTION INTRAVENOUS CONTINUOUS
Status: CANCELLED | OUTPATIENT
Start: 2017-07-27

## 2017-07-27 RX ORDER — PROPARACAINE HYDROCHLORIDE 5 MG/ML
1 SOLUTION/ DROPS OPHTHALMIC ONCE
Status: CANCELLED | OUTPATIENT
Start: 2017-07-27 | End: 2017-07-27

## 2017-07-27 RX ORDER — PHENYLEPHRINE HYDROCHLORIDE 25 MG/ML
1 SOLUTION/ DROPS OPHTHALMIC
Status: CANCELLED | OUTPATIENT
Start: 2017-07-27

## 2017-08-01 ENCOUNTER — HOSPITAL ENCOUNTER (OUTPATIENT)
Dept: PHYSICAL THERAPY | Facility: CLINIC | Age: 67
Setting detail: THERAPIES SERIES
End: 2017-08-01
Attending: PHYSICIAN ASSISTANT
Payer: COMMERCIAL

## 2017-08-01 PROCEDURE — 97110 THERAPEUTIC EXERCISES: CPT | Mod: GP | Performed by: PHYSICAL THERAPIST

## 2017-08-01 PROCEDURE — 40000718 ZZHC STATISTIC PT DEPARTMENT ORTHO VISIT: Performed by: PHYSICAL THERAPIST

## 2017-08-23 RX ORDER — DORZOLAMIDE HYDROCHLORIDE AND TIMOLOL MALEATE 20; 5 MG/ML; MG/ML
1 SOLUTION/ DROPS OPHTHALMIC 2 TIMES DAILY
COMMUNITY

## 2017-08-23 RX ORDER — BRIMONIDINE TARTRATE 2 MG/ML
1 SOLUTION/ DROPS OPHTHALMIC 2 TIMES DAILY
COMMUNITY

## 2017-08-24 ENCOUNTER — HOSPITAL ENCOUNTER (OUTPATIENT)
Facility: CLINIC | Age: 67
Discharge: HOME | End: 2017-08-24
Attending: OPHTHALMOLOGY | Admitting: OPHTHALMOLOGY
Payer: MEDICARE

## 2017-08-24 ENCOUNTER — HOSPITAL ENCOUNTER (OUTPATIENT)
Dept: NUCLEAR MEDICINE | Facility: CLINIC | Age: 67
Setting detail: NUCLEAR MEDICINE
End: 2017-08-24
Attending: OPHTHALMOLOGY
Payer: MEDICARE

## 2017-08-24 ENCOUNTER — ANESTHESIA (OUTPATIENT)
Dept: SURGERY | Facility: CLINIC | Age: 67
End: 2017-08-24
Payer: MEDICARE

## 2017-08-24 ENCOUNTER — ANESTHESIA EVENT (OUTPATIENT)
Dept: SURGERY | Facility: CLINIC | Age: 67
End: 2017-08-24
Payer: MEDICARE

## 2017-08-24 ENCOUNTER — APPOINTMENT (OUTPATIENT)
Dept: GENERAL RADIOLOGY | Facility: CLINIC | Age: 67
End: 2017-08-24
Attending: RADIOLOGY
Payer: MEDICARE

## 2017-08-24 ENCOUNTER — SURGERY (OUTPATIENT)
Age: 67
End: 2017-08-24

## 2017-08-24 VITALS
DIASTOLIC BLOOD PRESSURE: 76 MMHG | BODY MASS INDEX: 22.92 KG/M2 | WEIGHT: 160.1 LBS | SYSTOLIC BLOOD PRESSURE: 128 MMHG | RESPIRATION RATE: 14 BRPM | HEIGHT: 70 IN | TEMPERATURE: 98.2 F | OXYGEN SATURATION: 97 %

## 2017-08-24 DIAGNOSIS — C43.9 MELANOMA (H): ICD-10-CM

## 2017-08-24 DIAGNOSIS — C69.32 CHOROIDAL MALIGNANT MELANOMA, LEFT (H): Primary | ICD-10-CM

## 2017-08-24 DIAGNOSIS — C69.92 OCULAR MELANOMA, LEFT (H): ICD-10-CM

## 2017-08-24 LAB
HCT VFR BLD CALC: 47 %PCV (ref 40–53)
HGB BLD CALC-MCNC: 16 G/DL (ref 13.3–17.7)
POTASSIUM BLD-SCNC: 4.2 MMOL/L (ref 3.4–5.3)
SODIUM BLD-SCNC: 140 MMOL/L (ref 133–144)

## 2017-08-24 PROCEDURE — 25000125 ZZHC RX 250: Performed by: NURSE ANESTHETIST, CERTIFIED REGISTERED

## 2017-08-24 PROCEDURE — 25000128 H RX IP 250 OP 636: Performed by: NURSE ANESTHETIST, CERTIFIED REGISTERED

## 2017-08-24 PROCEDURE — A9270 NON-COVERED ITEM OR SERVICE: HCPCS | Mod: GY | Performed by: OPHTHALMOLOGY

## 2017-08-24 PROCEDURE — 85014 HEMATOCRIT: CPT

## 2017-08-24 PROCEDURE — 25000125 ZZHC RX 250: Performed by: ANESTHESIOLOGY

## 2017-08-24 PROCEDURE — 25000125 ZZHC RX 250: Performed by: OPHTHALMOLOGY

## 2017-08-24 PROCEDURE — 36000104 ZZH EYE SURGERY LEVEL 4 1ST 30 MIN: Performed by: OPHTHALMOLOGY

## 2017-08-24 PROCEDURE — 25000566 ZZH SEVOFLURANE, EA 15 MIN: Performed by: OPHTHALMOLOGY

## 2017-08-24 PROCEDURE — 37000008 ZZH ANESTHESIA TECHNICAL FEE, 1ST 30 MIN: Performed by: OPHTHALMOLOGY

## 2017-08-24 PROCEDURE — 25000128 H RX IP 250 OP 636: Performed by: OPHTHALMOLOGY

## 2017-08-24 PROCEDURE — 27210995 ZZH RX 272: Performed by: OPHTHALMOLOGY

## 2017-08-24 PROCEDURE — 40000986 XR SKULL PORT 1/3 VW

## 2017-08-24 PROCEDURE — S0020 INJECTION, BUPIVICAINE HYDRO: HCPCS | Performed by: OPHTHALMOLOGY

## 2017-08-24 PROCEDURE — 71000004 ZZH RECOVERY EYE PHASE 1 LEVEL 1 FIRST HR: Performed by: OPHTHALMOLOGY

## 2017-08-24 PROCEDURE — 27210794 ZZH OR GENERAL SUPPLY STERILE: Performed by: OPHTHALMOLOGY

## 2017-08-24 PROCEDURE — 40000170 ZZH STATISTIC PRE-PROCEDURE ASSESSMENT II: Performed by: OPHTHALMOLOGY

## 2017-08-24 PROCEDURE — 84132 ASSAY OF SERUM POTASSIUM: CPT

## 2017-08-24 PROCEDURE — 84295 ASSAY OF SERUM SODIUM: CPT

## 2017-08-24 PROCEDURE — 71000028 ZZH EYE RECOVERY PHASE 2 EACH 15 MINS: Performed by: OPHTHALMOLOGY

## 2017-08-24 PROCEDURE — 37000009 ZZH ANESTHESIA TECHNICAL FEE, EACH ADDTL 15 MIN: Performed by: OPHTHALMOLOGY

## 2017-08-24 PROCEDURE — 36000105 ZZH EYE SURGERY LEVEL 4 EA 15 ADDTL MIN: Performed by: OPHTHALMOLOGY

## 2017-08-24 DEVICE — PLAQUE EYE RADIOACTIVE
Type: IMPLANTABLE DEVICE | Site: EYE | Status: NON-FUNCTIONAL
Removed: 2017-08-28

## 2017-08-24 RX ORDER — BALANCED SALT SOLUTION 6.4; .75; .48; .3; 3.9; 1.7 MG/ML; MG/ML; MG/ML; MG/ML; MG/ML; MG/ML
SOLUTION OPHTHALMIC PRN
Status: DISCONTINUED | OUTPATIENT
Start: 2017-08-24 | End: 2017-08-24 | Stop reason: HOSPADM

## 2017-08-24 RX ORDER — PREDNISOLONE ACETATE 10 MG/ML
1 SUSPENSION/ DROPS OPHTHALMIC 4 TIMES DAILY
Qty: 10 ML | Refills: 0 | Status: SHIPPED | OUTPATIENT
Start: 2017-08-24

## 2017-08-24 RX ORDER — SODIUM CHLORIDE, SODIUM LACTATE, POTASSIUM CHLORIDE, CALCIUM CHLORIDE 600; 310; 30; 20 MG/100ML; MG/100ML; MG/100ML; MG/100ML
INJECTION, SOLUTION INTRAVENOUS CONTINUOUS
Status: DISCONTINUED | OUTPATIENT
Start: 2017-08-24 | End: 2017-08-24 | Stop reason: HOSPADM

## 2017-08-24 RX ORDER — DEXAMETHASONE SODIUM PHOSPHATE 4 MG/ML
INJECTION, SOLUTION INTRA-ARTICULAR; INTRALESIONAL; INTRAMUSCULAR; INTRAVENOUS; SOFT TISSUE PRN
Status: DISCONTINUED | OUTPATIENT
Start: 2017-08-24 | End: 2017-08-24 | Stop reason: HOSPADM

## 2017-08-24 RX ORDER — NALOXONE HYDROCHLORIDE 0.4 MG/ML
.1-.4 INJECTION, SOLUTION INTRAMUSCULAR; INTRAVENOUS; SUBCUTANEOUS
Status: DISCONTINUED | OUTPATIENT
Start: 2017-08-24 | End: 2017-08-24 | Stop reason: HOSPADM

## 2017-08-24 RX ORDER — PROPARACAINE HYDROCHLORIDE 5 MG/ML
1 SOLUTION/ DROPS OPHTHALMIC ONCE
Status: DISCONTINUED | OUTPATIENT
Start: 2017-08-24 | End: 2017-08-24 | Stop reason: HOSPADM

## 2017-08-24 RX ORDER — PROPOFOL 10 MG/ML
INJECTION, EMULSION INTRAVENOUS CONTINUOUS PRN
Status: DISCONTINUED | OUTPATIENT
Start: 2017-08-24 | End: 2017-08-24

## 2017-08-24 RX ORDER — ONDANSETRON 4 MG/1
4 TABLET, ORALLY DISINTEGRATING ORAL EVERY 30 MIN PRN
Status: DISCONTINUED | OUTPATIENT
Start: 2017-08-24 | End: 2017-08-24 | Stop reason: HOSPADM

## 2017-08-24 RX ORDER — PHENYLEPHRINE HYDROCHLORIDE 25 MG/ML
1 SOLUTION/ DROPS OPHTHALMIC
Status: COMPLETED | OUTPATIENT
Start: 2017-08-24 | End: 2017-08-24

## 2017-08-24 RX ORDER — TETRACAINE HYDROCHLORIDE 5 MG/ML
SOLUTION OPHTHALMIC PRN
Status: DISCONTINUED | OUTPATIENT
Start: 2017-08-24 | End: 2017-08-24 | Stop reason: HOSPADM

## 2017-08-24 RX ORDER — ONDANSETRON 2 MG/ML
INJECTION INTRAMUSCULAR; INTRAVENOUS PRN
Status: DISCONTINUED | OUTPATIENT
Start: 2017-08-24 | End: 2017-08-24

## 2017-08-24 RX ORDER — FENTANYL CITRATE 50 UG/ML
25-100 INJECTION, SOLUTION INTRAMUSCULAR; INTRAVENOUS
Status: DISCONTINUED | OUTPATIENT
Start: 2017-08-24 | End: 2017-08-24 | Stop reason: HOSPADM

## 2017-08-24 RX ORDER — DIAZEPAM 10 MG/2ML
2.5 INJECTION, SOLUTION INTRAMUSCULAR; INTRAVENOUS
Status: DISCONTINUED | OUTPATIENT
Start: 2017-08-24 | End: 2017-08-24 | Stop reason: HOSPADM

## 2017-08-24 RX ORDER — PHENYLEPHRINE HYDROCHLORIDE 25 MG/ML
1 SOLUTION/ DROPS OPHTHALMIC
Status: DISCONTINUED | OUTPATIENT
Start: 2017-08-24 | End: 2017-08-24 | Stop reason: HOSPADM

## 2017-08-24 RX ORDER — PROPARACAINE HYDROCHLORIDE 5 MG/ML
1 SOLUTION/ DROPS OPHTHALMIC ONCE
Status: COMPLETED | OUTPATIENT
Start: 2017-08-24 | End: 2017-08-24

## 2017-08-24 RX ORDER — EPHEDRINE SULFATE 50 MG/ML
INJECTION, SOLUTION INTRAMUSCULAR; INTRAVENOUS; SUBCUTANEOUS PRN
Status: DISCONTINUED | OUTPATIENT
Start: 2017-08-24 | End: 2017-08-24

## 2017-08-24 RX ORDER — LABETALOL HYDROCHLORIDE 5 MG/ML
10 INJECTION, SOLUTION INTRAVENOUS
Status: DISCONTINUED | OUTPATIENT
Start: 2017-08-24 | End: 2017-08-24 | Stop reason: HOSPADM

## 2017-08-24 RX ORDER — GLYCOPYRROLATE 0.2 MG/ML
INJECTION, SOLUTION INTRAMUSCULAR; INTRAVENOUS PRN
Status: DISCONTINUED | OUTPATIENT
Start: 2017-08-24 | End: 2017-08-24

## 2017-08-24 RX ORDER — CYCLOPENTOLATE HYDROCHLORIDE 10 MG/ML
1 SOLUTION/ DROPS OPHTHALMIC
Status: COMPLETED | OUTPATIENT
Start: 2017-08-24 | End: 2017-08-24

## 2017-08-24 RX ORDER — LIDOCAINE HYDROCHLORIDE 20 MG/ML
INJECTION, SOLUTION INFILTRATION; PERINEURAL PRN
Status: DISCONTINUED | OUTPATIENT
Start: 2017-08-24 | End: 2017-08-24

## 2017-08-24 RX ORDER — FENTANYL CITRATE 50 UG/ML
INJECTION, SOLUTION INTRAMUSCULAR; INTRAVENOUS PRN
Status: DISCONTINUED | OUTPATIENT
Start: 2017-08-24 | End: 2017-08-24

## 2017-08-24 RX ORDER — ATROPINE SULFATE 10 MG/ML
1 SOLUTION/ DROPS OPHTHALMIC AT BEDTIME
Qty: 5 ML | Refills: 0 | Status: SHIPPED | OUTPATIENT
Start: 2017-08-24

## 2017-08-24 RX ORDER — PROPOFOL 10 MG/ML
INJECTION, EMULSION INTRAVENOUS PRN
Status: DISCONTINUED | OUTPATIENT
Start: 2017-08-24 | End: 2017-08-24

## 2017-08-24 RX ORDER — ACETAMINOPHEN 650 MG/1
650 SUPPOSITORY RECTAL EVERY 4 HOURS PRN
Status: DISCONTINUED | OUTPATIENT
Start: 2017-08-24 | End: 2017-08-24 | Stop reason: HOSPADM

## 2017-08-24 RX ORDER — FENTANYL CITRATE 50 UG/ML
25-50 INJECTION, SOLUTION INTRAMUSCULAR; INTRAVENOUS
Status: DISCONTINUED | OUTPATIENT
Start: 2017-08-24 | End: 2017-08-24 | Stop reason: HOSPADM

## 2017-08-24 RX ORDER — CYCLOPENTOLATE HYDROCHLORIDE 10 MG/ML
1 SOLUTION/ DROPS OPHTHALMIC
Status: DISCONTINUED | OUTPATIENT
Start: 2017-08-24 | End: 2017-08-24 | Stop reason: HOSPADM

## 2017-08-24 RX ORDER — ATROPINE SULFATE 10 MG/ML
SOLUTION/ DROPS OPHTHALMIC PRN
Status: DISCONTINUED | OUTPATIENT
Start: 2017-08-24 | End: 2017-08-24 | Stop reason: HOSPADM

## 2017-08-24 RX ORDER — NEOMYCIN SULFATE, POLYMYXIN B SULFATE AND GRAMICIDIN 1.75; 10000; .025 MG/ML; [USP'U]/ML; MG/ML
SOLUTION/ DROPS OPHTHALMIC PRN
Status: DISCONTINUED | OUTPATIENT
Start: 2017-08-24 | End: 2017-08-24 | Stop reason: HOSPADM

## 2017-08-24 RX ORDER — MEPERIDINE HYDROCHLORIDE 25 MG/ML
12.5 INJECTION INTRAMUSCULAR; INTRAVENOUS; SUBCUTANEOUS
Status: DISCONTINUED | OUTPATIENT
Start: 2017-08-24 | End: 2017-08-24 | Stop reason: HOSPADM

## 2017-08-24 RX ORDER — ONDANSETRON 2 MG/ML
4 INJECTION INTRAMUSCULAR; INTRAVENOUS EVERY 30 MIN PRN
Status: DISCONTINUED | OUTPATIENT
Start: 2017-08-24 | End: 2017-08-24 | Stop reason: HOSPADM

## 2017-08-24 RX ADMIN — TETRACAINE HYDROCHLORIDE 2 DROP: 5 SOLUTION OPHTHALMIC at 16:13

## 2017-08-24 RX ADMIN — SODIUM CHLORIDE, POTASSIUM CHLORIDE, SODIUM LACTATE AND CALCIUM CHLORIDE: 600; 310; 30; 20 INJECTION, SOLUTION INTRAVENOUS at 13:36

## 2017-08-24 RX ADMIN — Medication 5 MG: at 16:32

## 2017-08-24 RX ADMIN — MIDAZOLAM HYDROCHLORIDE 2 MG: 1 INJECTION, SOLUTION INTRAMUSCULAR; INTRAVENOUS at 15:40

## 2017-08-24 RX ADMIN — PROPOFOL 30 MCG/KG/MIN: 10 INJECTION, EMULSION INTRAVENOUS at 15:48

## 2017-08-24 RX ADMIN — CYCLOPENTOLATE HYDROCHLORIDE 1 DROP: 10 SOLUTION/ DROPS OPHTHALMIC at 13:39

## 2017-08-24 RX ADMIN — WATER 0.5 ML: 1 INJECTION INTRAMUSCULAR; INTRAVENOUS; SUBCUTANEOUS at 16:18

## 2017-08-24 RX ADMIN — NEOMYCIN SULFATE, POLYMYXIN B SULFATE, AND DEXAMETHASONE 1 TUBE: 3.5; 10000; 1 OINTMENT OPHTHALMIC at 16:55

## 2017-08-24 RX ADMIN — DEXMEDETOMIDINE HYDROCHLORIDE 12 MCG: 100 INJECTION, SOLUTION INTRAVENOUS at 16:06

## 2017-08-24 RX ADMIN — NEOMYCIN SULFATE, POLYMYXIN B SULFATE AND GRAMICIDIN 2 DROP: 1.75; 10000; .025 SOLUTION/ DROPS OPHTHALMIC at 16:19

## 2017-08-24 RX ADMIN — LIDOCAINE HYDROCHLORIDE 3.5 ML: 20 INJECTION, SOLUTION EPIDURAL; INFILTRATION; INTRACAUDAL; PERINEURAL at 17:10

## 2017-08-24 RX ADMIN — DEXAMETHASONE SODIUM PHOSPHATE 2 MG: 4 INJECTION, SOLUTION INTRA-ARTICULAR; INTRALESIONAL; INTRAMUSCULAR; INTRAVENOUS; SOFT TISSUE at 16:18

## 2017-08-24 RX ADMIN — PROPARACAINE HYDROCHLORIDE 1 DROP: 5 SOLUTION/ DROPS OPHTHALMIC at 13:30

## 2017-08-24 RX ADMIN — LIDOCAINE HYDROCHLORIDE 3 ML: 20 INJECTION, SOLUTION EPIDURAL; INFILTRATION; INTRACAUDAL; PERINEURAL at 16:55

## 2017-08-24 RX ADMIN — FENTANYL CITRATE 25 MCG: 50 INJECTION, SOLUTION INTRAMUSCULAR; INTRAVENOUS at 15:40

## 2017-08-24 RX ADMIN — ATROPINE SULFATE 2 DROP: 10 SOLUTION OPHTHALMIC at 16:54

## 2017-08-24 RX ADMIN — LIDOCAINE HYDROCHLORIDE 1 ML: 10 INJECTION, SOLUTION EPIDURAL; INFILTRATION; INTRACAUDAL; PERINEURAL at 13:36

## 2017-08-24 RX ADMIN — CYCLOPENTOLATE HYDROCHLORIDE 1 DROP: 10 SOLUTION/ DROPS OPHTHALMIC at 13:34

## 2017-08-24 RX ADMIN — Medication 5 MG: at 15:44

## 2017-08-24 RX ADMIN — PROPOFOL 200 MG: 10 INJECTION, EMULSION INTRAVENOUS at 15:40

## 2017-08-24 RX ADMIN — BALANCED SALT SOLUTION 15 ML: 6.4; .75; .48; .3; 3.9; 1.7 SOLUTION OPHTHALMIC at 16:12

## 2017-08-24 RX ADMIN — FENTANYL CITRATE 25 MCG: 50 INJECTION, SOLUTION INTRAMUSCULAR; INTRAVENOUS at 16:10

## 2017-08-24 RX ADMIN — PHENYLEPHRINE HYDROCHLORIDE 1 DROP: 2.5 SOLUTION/ DROPS OPHTHALMIC at 13:39

## 2017-08-24 RX ADMIN — SODIUM CHLORIDE, POTASSIUM CHLORIDE, SODIUM LACTATE AND CALCIUM CHLORIDE: 600; 310; 30; 20 INJECTION, SOLUTION INTRAVENOUS at 16:15

## 2017-08-24 RX ADMIN — PHENYLEPHRINE HYDROCHLORIDE 1 DROP: 2.5 SOLUTION/ DROPS OPHTHALMIC at 13:30

## 2017-08-24 RX ADMIN — Medication 5 MG: at 16:41

## 2017-08-24 RX ADMIN — PROPOFOL 50 MG: 10 INJECTION, EMULSION INTRAVENOUS at 15:41

## 2017-08-24 RX ADMIN — ONDANSETRON 4 MG: 2 INJECTION INTRAMUSCULAR; INTRAVENOUS at 16:00

## 2017-08-24 RX ADMIN — LIDOCAINE HYDROCHLORIDE 50 MG: 20 INJECTION, SOLUTION INFILTRATION; PERINEURAL at 15:40

## 2017-08-24 RX ADMIN — GLYCOPYRROLATE 0.2 MG: 0.2 INJECTION, SOLUTION INTRAMUSCULAR; INTRAVENOUS at 15:55

## 2017-08-24 RX ADMIN — HYPROMELLOSE 2906 (4000 MPA.S) AND HYPROMELLOSE 2906 (50 MPA.S) 2 DROP: 25; 25 SOLUTION OPHTHALMIC at 16:12

## 2017-08-24 RX ADMIN — CYCLOPENTOLATE HYDROCHLORIDE 1 DROP: 10 SOLUTION/ DROPS OPHTHALMIC at 13:30

## 2017-08-24 RX ADMIN — PHENYLEPHRINE HYDROCHLORIDE 1 DROP: 2.5 SOLUTION/ DROPS OPHTHALMIC at 13:34

## 2017-08-24 ASSESSMENT — LIFESTYLE VARIABLES: TOBACCO_USE: 1

## 2017-08-24 NOTE — OR NURSING
Dr. Garsia here at bedside on patients arrival.  Skull X-ray taken in [facility] PACU post op.  Dr. Garsia removed shield and replaced after x-ray.  VSS.  Pt doing well.

## 2017-08-24 NOTE — IP AVS SNAPSHOT
[facility]    [address]    [place] [address]    Phone:  [phone]                                       After Visit Summary   8/24/2017    Jake Araiza    MRN: [id number]           After Visit Summary Signature Page     I have received my discharge instructions, and my questions have been answered. I have discussed any challenges I see with this plan with the nurse or doctor.    ..........................................................................................................................................  Patient/Patient Representative Signature      ..........................................................................................................................................  Patient Representative Print Name and Relationship to Patient    ..................................................               ................................................  Date                                            Time    ..........................................................................................................................................  Reviewed by Signature/Title    ...................................................              ..............................................  Date                                                            Time

## 2017-08-24 NOTE — BRIEF OP NOTE
[facility]  Ophthalmology Brief Operative Note    Pre-operative diagnosis: CHOROIDAL MALIGNANT MELANOMA WITH EXTRAOCULAR EXTENSION/MASS OS     SAME   Procedure: Procedure(s):  INSERT PLAQUE RADIOACTIVE OS   Surgeon: Miracle Luna MD   Assistants(s): Aubrey Garsia MD   Anesthesia: General    Estimated blood loss: Less than 1 cc    Total IV fluids: (See anesthesia record)   Blood transfusion: No transfusion was given during surgery   Total urine output: (See anesthesia record)   Drains: None   Specimens: None   Implants: I-125 plaque 24 seeds intact   Findings: Same as preop   Complications: None   Condition: Stable   Comments: See dictated operative report for full details

## 2017-08-24 NOTE — DISCHARGE INSTRUCTIONS
[facility] Discharge  Instructions  Anesthesia ([facility])   Adult Discharge Instructions (General)    For 24 hours after surgery    1. Get plenty of rest.  Make arrangements to have a responsible adult stay with you for at least 24 hours.  2. Do not drive or use heavy equipment for 24 hours.    3. Do not drink alcohol for 24 hours.  4. Do not sign legal documents or make important decisions for 24 hours.  5. Avoid strenuous or risky activities. You may feel lightheaded.  If so, sit for a few minutes before standing.  Have someone help you get up.   6. General anesthesia patients should drink only clear liquids (apple juice, ginger ale, broth or 7-Up). Be sure to drink enough fluids.  Move to a regular diet as you feel able.  7. Any questions of medical nature, call your physician.    [facility]  Discharge Instructions for Radioactive Plaque Insertion  Miracle Luna M.D.    Activity:  -You may be up and around as much as you like.    -You may shower, shave, and wash your hair.  -You may stoop or bend.  You may read or watch TV.  -Beginning the day after surgery, you may drive if your vision is sufficient to do so.  If you  have had any sedative medications do not drive for 24 hours.  -Only very strenuous activities (competitive sports, snowmobiling, etc) need to be  avoided for the first few weeks.  No swimming or gardening for one week.    Eye Protection:  -You must wear the lead-lined eye shield at all times when other persons are around, and must maintain a distance of 6 feet or more from them.  Please remember to bring all shields with you when you return for plaque removal.  -Keep the eye clean.  You may use clean tissue, cotton applicators, a moist washcloth,  and the like to remove any crusted material from the lids.     Eye Drops:    -You will have prescriptions for eye drops.  Usually one will dilate your eye and one will help to minimize  post-operative inflammation. You should begin the drops as directed the day after surgery.  Please bring all eye drops to any post operative visit.    What to Expect:  -The operated eye will be more sensitive to light than usual. Blurred and/or double vision is also common, but will improve with time.  Your eye may be irritated and tender for several days and some swelling and redness are to be expected.      - You may use acetaminophen (Tylenol) or ibuprofen (Advil) as directed to make you more comfortable.  If pain, swelling, or discharge increases at home, or if you have decreased vision, or any other serious concerns, call the office at  [phone].  The phone is answered 24 hours a day.    General Information:  -How soon the vision will return depends on the nature of the problem for which you had surgery.  In many cases, it may take several weeks or months for the vision to return.    *Please DO NOT remove your  [facility] armband after leaving today.  It will be used by Radiology upon your arrival back to the hospital when the plaque is removed.

## 2017-08-24 NOTE — PLAN OF CARE
Patients prescriptions printed in the eye center and were signed before surgeon left and to send with patients family to fill as discharge pharmacy is closed.

## 2017-08-24 NOTE — ANESTHESIA CARE TRANSFER NOTE
Patient: Jake Araiza    Procedure(s):  LEFT EYE INSERTION  PLAQUE - Wound Class: I-Clean    Diagnosis: CHOROIDIAL MASS  Diagnosis Additional Information: No value filed.    Anesthesia Type:   General     Note:  Airway :Face Mask  Patient transferred to:PACU  Comments: Transferred to PACU, spontaneous respirations, 10L oxygen via facemask.  All monitors and alarms on and functioning, VSS.  Patient awake, comfortable.  Report to PACU RN.      Vitals: (Last set prior to Anesthesia Care Transfer)    CRNA VITALS  8/24/2017 1704 - 8/24/2017 1740      8/24/2017             Pulse: 86    Ht Rate: 86    SpO2: 99 %    Resp Rate (observed): 10                Electronically Signed By: ESPERANZA Ko CRNA  August 24, 2017  5:40 PM

## 2017-08-24 NOTE — ANESTHESIA PREPROCEDURE EVALUATION
Anesthesia Evaluation     . Pt has had prior anesthetic.     No history of anesthetic complications          ROS/MED HX    ENT/Pulmonary:     (+)tobacco use, Past use , . .   (-) sleep apnea   Neurologic:       Cardiovascular:         METS/Exercise Tolerance:     Hematologic:     (+) Other Hematologic Disorder-Raynaud's      Musculoskeletal:   (+) arthritis, , , -       GI/Hepatic:        (-) GERD   Renal/Genitourinary:         Endo:         Psychiatric: Comment: EtOH abuse per chart.  He says he has a couple beers each day.  Never had withdrawals.  No liver problems per patient         Infectious Disease:         Malignancy:   (+) Malignancy (melanoma) History of Skin  Skin CA Active status post,         Other:                     Physical Exam  Normal systems: cardiovascular, pulmonary and dental    Airway   Mallampati: II  TM distance: >3 FB  Neck ROM: full    Dental     Cardiovascular       Pulmonary                     Anesthesia Plan      History & Physical Review  History and physical reviewed and following examination; no interval change.    ASA Status:  3 .    NPO Status:  > 8 hours    Plan for General and LMA with Intravenous induction. Maintenance will be Balanced.    PONV prophylaxis:  Ondansetron (or other 5HT-3)       Postoperative Care  Postoperative pain management:  IV analgesics.      Consents  Anesthetic plan, risks, benefits and alternatives discussed with:  Patient..                          .

## 2017-08-24 NOTE — IP AVS SNAPSHOT
MRN:[id number]                      After Visit Summary   8/24/2017    Jake Araiza    MRN: [id number]           Thank you!     Thank you for choosing [facility] for your care. Our goal is always to provide you with excellent care. Hearing back from our patients is one way we can continue to improve our services. Please take a few minutes to complete the written survey that you may receive in the mail after you visit with us. Thank you!        Patient Information     Date Of Birth          1950        About your hospital stay     You were admitted on:  August 24, 2017 You last received care in the:  [facility]    You were discharged on:  August 24, 2017       Who to Call     For medical emergencies, please call 911.  For non-urgent questions about your medical care, please call your primary care provider or clinic, [phone]  For questions related to your surgery, please call your surgery clinic        Attending Provider     Provider Miracle Heller MD Ophthalmology       Primary Care Provider Office Phone # Fax #    RADHA Jean Carlos Jaimes [phone]      After Care Instructions     Activity       Avoid strenuous activities the next several days.            Discharge Instructions - Wear eye patch and eye shield        Can start drops tomorrow am. Keep lead shield on.                  Follow-up Appointments     Follow Up       As scheduled on Monday for plaque removal.  Bring all eye medications to follow up visit.                  Your next 10 appointments already scheduled     Aug 24, 2017  6:30 PM CDT   XR SKULL PORT 1/3 VIEWS with SHXRP1   [facility] Radiology ([facility])    [address] [place] [address] [phone]           Please bring a list of your current medicines to your exam. (Include vitamins, minerals and over-thecounter medicines.) Leave your valuables at home.  Tell your doctor if  there is a chance you may be pregnant.  You do not need to do anything special for this exam.            Aug 28, 2017   Procedure with Miracle Luna MD   [facility] PeriOP Services (--)    [address], [address]  [place] [address]   [phone]            Aug 28, 2017  3:00 PM CDT   [facility]AP[facility]   [facility] Nuclear Medicine ([facility])    [address] [address]   [phone]            Oct 04, 2017 10:00 AM CDT   PET ONCOLOGY WHOLE BODY with WYPETCT1   [facility] Pet CT ([facility])    [address] [place] [address] [phone]           Tell your doctor:   If there is any chance you may be pregnant or if you are breastfeeding.   If you have problems lying in small spaces (claustrophobia). If you do, your doctor may give you medicine to help you relax. If you have diabetes:   Have your exam early in the morning. Your blood glucose will go up as the day goes by.   Your glucose level must be 180 or less at the start of the exam. Please take any medicines you need to ensure this blood glucose level. 24 hours before your scan: Don t do any heavy exercise. (No jogging, aerobics or other workouts.) Exercise will make your pictures less accurate. 6 hours before your scan:   Stop all food and liquids (except water).   Do not chew gum or suck on mints.   If you need to take medicine with food, you may take it with a few crackers.  Please call your Imaging Department at your exam site with any questions.            Oct 12, 2017  3:30 PM CDT   [facility] Draw with  [facility] LAB DRAW   [facility] Draw ([facility])    [address]  2nd Floor  [place] [address] [phone]            Oct 12, 2017  4:00 PM CDT   (Arrive by 3:45 PM)   Return Visit with Asa Mcintosh MD   [facility] ([facility])    [address] [address] [phone]              Further instructions from your care team       [facility] Discharge  Instructions  Anesthesia ([facility] Care Center)   Adult Discharge Instructions (General)    For 24 hours after surgery    1. Get plenty of rest.  Make arrangements to have a responsible adult stay with you for at least 24 hours.  2. Do not drive or use heavy equipment for 24 hours.    3. Do not drink alcohol for 24 hours.  4. Do not sign legal documents or make important decisions for 24 hours.  5. Avoid strenuous or risky activities. You may feel lightheaded.  If so, sit for a few minutes before standing.  Have someone help you get up.   6. General anesthesia patients should drink only clear liquids (apple juice, ginger ale, broth or 7-Up). Be sure to drink enough fluids.  Move to a regular diet as you feel able.  7. Any questions of medical nature, call your physician.    [facility]  Discharge Instructions for Radioactive Plaque Insertion  Miracle Luna M.D.    Activity:  -You may be up and around as much as you like.    -You may shower, shave, and wash your hair.  -You may stoop or bend.  You may read or watch TV.  -Beginning the day after surgery, you may drive if your vision is sufficient to do so.  If you  have had any sedative medications do not drive for 24 hours.  -Only very strenuous activities (competitive sports, snowmobiling, etc) need to be  avoided for the first few weeks.  No swimming or gardening for one week.    Eye Protection:  -You must wear the lead-lined eye shield at all times when other persons are around, and must maintain a distance of 6 feet or more from them.  Please remember to bring all shields with you when you return for plaque removal.  -Keep the eye clean.  You may use clean tissue, cotton applicators, a moist washcloth,  and the like to remove any crusted  "material from the lids.     Eye Drops:    -You will have prescriptions for eye drops.  Usually one will dilate your eye and one will help to minimize post-operative inflammation. You should begin the drops as directed the day after surgery.  Please bring all eye drops to any post operative visit.    What to Expect:  -The operated eye will be more sensitive to light than usual. Blurred and/or double vision is also common, but will improve with time.  Your eye may be irritated and tender for several days and some swelling and redness are to be expected.      - You may use acetaminophen (Tylenol) or ibuprofen (Advil) as directed to make you more comfortable.  If pain, swelling, or discharge increases at home, or if you have decreased vision, or any other serious concerns, call the office at  [phone].  The phone is answered 24 hours a day.    General Information:  -How soon the vision will return depends on the nature of the problem for which you had surgery.  In many cases, it may take several weeks or months for the vision to return.    *Please DO NOT remove your  [facility] armband after leaving today.  It will be used by Radiology upon your arrival back to the hospital when the plaque is removed.    Pending Results     Date and Time Order Name Status Description    8/24/2017 1719 XR Skull Port 1/3 Views In process             Admission Information     Date & Time Provider Department Dept. Phone    8/24/2017 Miracle Luna MD [facility] [phone]      Your Vitals Were     Blood Pressure Temperature Respirations Height Weight Pulse Oximetry    138/79 97.8  F (36.6  C) (Temporal) 14 1.778 m (5' 10\") 72.6 kg (160 lb 1.6 oz) 96%    BMI (Body Mass Index)                   22.97 kg/m2           MyChart Information     "[employer]" lets you send messages to your doctor, view your test results, renew your prescriptions, schedule appointments and more. To sign up, go to " "[email or web address] . Click on \"Log in\" on the left side of the screen, which will take you to the Welcome page. Then click on \"Sign up Now\" on the right side of the page.     You will be asked to enter the access code listed below, as well as some personal information. Please follow the directions to create your username and password.     Your access code is: [email or web address]  Expires: 2017  9:26 AM     Your access code will  in 90 days. If you need help or a new code, please call your [place] clinic or [phone].        Care EveryWhere ID     This is your Care EveryWhere ID. This could be used by other organizations to access your [place] medical records  [id number]        Equal Access to Services     ART CORREA : Shraddha Michel, wadiane everett, christian kaalyenny stewart, vicente noel . So [facility] [phone].    ATENCIÓN: Si habla español, tiene a waterman disposición servicios gratuitos de asistencia lingüística. Angelica al [phone].    We comply with applicable federal civil rights laws and [place] laws. We do not discriminate on the basis of race, color, national origin, age, disability sex, sexual orientation or gender identity.               Review of your medicines      UNREVIEWED medicines. Ask your doctor about these medicines        Dose / Directions    ALEVE 220 MG capsule   Generic drug:  naproxen sodium        Dose:  250 mg   Take 250 mg by mouth 2 times daily as needed   Refills:  0       brimonidine 0.2 % ophthalmic solution   Commonly known as:  ALPHAGAN        Dose:  1 drop   Place 1 drop Into the left eye 2 times daily   Refills:  0       dorzolamide-timolol 2-0.5 % ophthalmic solution   Commonly known as:  COSOPT        Dose:  1 drop   Place 1 drop Into the left eye 2 times daily   Refills:  0       UNABLE TO FIND        Dose:  1 drop   Place 1 drop Into the left eye 2 times daily Pt is taking anti-pressure eyedrops, but does not " remember the name.   Refills:  0         START taking        Dose / Directions    atropine 1 % ophthalmic solution   Used for:  Choroidal malignant melanoma, left (H)        Dose:  1 drop   Apply 1 drop to eye At Bedtime Instill into the operative eye(s) as directed per physician instructions.   Quantity:  5 mL   Refills:  0       prednisoLONE acetate 1 % ophthalmic susp   Commonly known as:  PRED FORTE   Used for:  Choroidal malignant melanoma, left (H)        Dose:  1 drop   Apply 1 drop to eye 4 times daily Instill into operative eye(s) per physician instructions.   Quantity:  10 mL   Refills:  0            Where to get your medicines      Some of these will need a paper prescription and others can be bought over the counter. Ask your nurse if you have questions.     Bring a paper prescription for each of these medications     atropine 1 % ophthalmic solution    prednisoLONE acetate 1 % ophthalmic susp                Protect others around you: Learn how to safely use, store and throw away your medicines at www.disposemymeds.org.             Medication List: This is a list of all your medications and when to take them. Check marks below indicate your daily home schedule. Keep this list as a reference.      Medications           Morning Afternoon Evening Bedtime As Needed    ALEVE 220 MG capsule   Take 250 mg by mouth 2 times daily as needed   Generic drug:  naproxen sodium                                atropine 1 % ophthalmic solution   Apply 1 drop to eye At Bedtime Instill into the operative eye(s) as directed per physician instructions.   Last time this was given:  2 drops on 8/24/2017  4:54 PM                                brimonidine 0.2 % ophthalmic solution   Commonly known as:  ALPHAGAN   Place 1 drop Into the left eye 2 times daily                                dorzolamide-timolol 2-0.5 % ophthalmic solution   Commonly known as:  COSOPT   Place 1 drop Into the left eye 2 times daily                                 prednisoLONE acetate 1 % ophthalmic susp   Commonly known as:  PRED FORTE   Apply 1 drop to eye 4 times daily Instill into operative eye(s) per physician instructions.                                UNABLE TO FIND   Place 1 drop Into the left eye 2 times daily Pt is taking anti-pressure eyedrops, but does not remember the name.

## 2017-08-24 NOTE — ANESTHESIA POSTPROCEDURE EVALUATION
Patient: Jake Araiza    Procedure(s):  LEFT EYE INSERTION  PLAQUE - Wound Class: I-Clean    Diagnosis:CHOROIDIAL MASS  Diagnosis Additional Information: No value filed.    Anesthesia Type:  General    Note:  Anesthesia Post Evaluation    Patient location during evaluation: PACU  Patient participation: Able to fully participate in evaluation  Level of consciousness: sleepy but conscious and responsive to verbal stimuli  Pain management: adequate  Airway patency: patent  Cardiovascular status: acceptable and hemodynamically stable  Respiratory status: acceptable and unassisted  Hydration status: acceptable  PONV: none     Anesthetic complications: None          Last vitals:  Vitals:    08/24/17 1332 08/24/17 1738   BP: 134/81 149/84   Resp: 16 10   Temp: 36.4  C (97.5  F) 36.6  C (97.8  F)   SpO2: 100% 99%         Electronically Signed By: Sergey Lopez MD  August 24, 2017  5:54 PM

## 2017-08-25 NOTE — OP NOTE
DATE OF SERVICE:  08/24/2017.      PREOPERATIVE DIAGNOSES:   1.  Choroidal malignant melanoma, left eye.   2.  Extraocular extension, left eye.   3.  Amblyopia, left eye.      POSTOPERATIVE DIAGNOSES:   1.  Choroidal malignant melanoma, left eye.   2.  Extraocular extension, left eye.   3.  Amblyopia, left eye.       PROCEDURES:   1.  Exterior scleral surface marking of the pseudo choroidal tumor, left eye.   2.  I-125 plaque placement, left eye.   3.  Merocel sponge.    4.  Subconjunctival injections of dexamethasone, Ancef, left eye.   5.  Retrobulbar Marcaine 0.7 Rwandan 3 mL, left eye.      ANESTHESIA:  General.      SURGEON:  Miracle Luna MD       ASSISTANT:  None.      COMPLICATIONS:  None.      DISPOSITION:  The patient tolerated the procedure well.  Was escorted to recovery in stable condition.      INDICATIONS FOR THE PROCEDURE:  Jake Araiza was noted to have count fingers poor visual acuity in the left eye.  The patient was noted to have count fingers impaired visual acuity in the left eye with a history of posterior scleritis that began approximately 1-1/2 years prior and then was treated with prednisone.  Had reduction of pain and inflammation in the eye.  Was noted to have a subconjunctival nodule superiorly and a choroidal mass and that subconjunctival nodule superiorly was noted to be elevated and then the patient was sent for consultation.  On my exam, the patient had appearance of a pseudo choroidal malignant melanoma, partly amelanotic, with transillumination defect that ranged from superonasal to superiorly to temporally and the extent of the transillumination was approximately 16 mm x 10 mm by transpupillary transillumination, but the basal diameter of the lesion appeared to be approximately 16 x 10 mm with 4.7 mm in apical height and including the conjunctival nodule on the exterior scleral surface superiorly.  The subconjunctival nodule measured approximately 8 x 9 x 2 mm on the external  scleral surface.  I discussed with the patient the diagnosis, guarded prognosis, risks, benefits, alternatives for I-125 plaque placement and I discussed risks, benefits, alternatives for consultation with Dr. Carla Sepulveda in [place].  However, the patient decided to have surgery with me without a consultation with Dr. Sepulveda and Dr. Sepulveda agreed with the diagnosis and surgical plan of I-125 plaque in this eye.  After a thorough discussion of risks, benefits and alternatives of surgery, the patient opted for surgery with me at [facility] for I-125 plaque placement after meeting with Dr. Garsia as well.  The patient opted for surgery on 08/24/2017.      PROCEDURE NOTE:  He understood the guarded prognosis due to the anterior nature of the tumor and the pseudo choroidal tumor with extraocular extension and his systemic workup was negative to date for metastatic disease; however, there would still be risks in the future and he would need regular followup and regular metastatic workup every 6 months thereafter.      After appropriate proper consent was obtained, the patient was brought to the [facility] operating room.  The anesthesiologist placed the patient under general anesthesia and then the left eye was prepped and draped in the usual sterile fashion for ophthalmic surgery including trimming the eyelashes and topical Betadine placed on the conjunctival surface.  A lid speculum was placed, lids opened.  Preparation was made for placement for I-125 plaque as follows.  The superior extraocular extension portion of the tumor was avoided with a 2 mm margin surrounding the lesion.  A curvilinear incision was made to open the conjunctiva with a 2 mm margin around the entire area of the extraocular extension superiorly and then the remainder of the conjunctiva opening was formed at the limbus at 1 mm post-limbus.  Muscle hooks and 2-0 silk suture were used to isolate each rectus  muscles.  Each of the scleral quadrants was inspected and found to be free of pathology with exception of the conjunctival and subconjunctival nodule superiorly which was already noted and marked, then transillumination by transpupillary transillumination was used to ailyn the extent of the pseudo choroidal tumor and the dummy.  This was marked with a marking pen on external scleral surface.  A dummy plaque was placed into position, which was a 20 mm dummy plaque, and the size of the plaque chosen was a 20 mm plaque, which would appropriately treat the tumor.  Then, injections and then the suture placement marks were marked with a dummy plaque in position with 3 sutures in supranasal quadrant and 1 suture in the superotemporal quadrant just superior to the lateral rectus muscle.      Then, the 5-0 Mersilene sutures were passed which would anchor the I-125 plaque to the episcleral surface.  Those were passed in the appropriate eyelid position that had been marked in the episcleral surface.  Three placement locations were noted in the supranasal quadrant and 1 location in the superotemporal quadrant.  This would anchor the plaque in position and the I-125 plaque was examined on the surgical tray and had 24 seeds.  The plaque was intact and it was then placed onto the surgical field, onto the surface of the episcleral surface and was anchored into position with the sutures that had already been preplaced in the episcleral surface, the 5-0 Mersilene sutures.  The sutures were temporarily tied.  The plaque had appropriate coverage of all tumor margins and could be clearly visualized since the tumor margins had already been marked on the external surface with a marking pen.  After the plaque was placed in position and was confirmed, then the 5-0 Mersilene sutures were permanently tied and the knots were left trimmed and left long to aid in retrieval.  The silk muscle traction sutures were removed.  A retrobulbar Marcaine  0.7 Barbadian 3 mL administered on a blunt cannula for post op  analgesia.  Subconjunctival injection of Ancef, dexamethasone administered along with atropine 1% drop placed on the surface of the conjunctiva, 5% Betadine and Maxitrol ointment placed on the surface of the conjunctiva as well.  At the end the case, intraocular pressure was palpated and found to be 15-20 mmHg.  The anterior chamber was deep and formed, although it was occluded by the plaque.  The conjunctivae was closed over the entire plaque surface and sterile patch and shield were taped in place over the eye.  A lead shield was taped in place to the eye.  Nuclear Medicine did appropriate counts of radioactivity and there was no residual radioactivity remaining on the surgical field or on the surgeon's hand and appropriate radiation counts were present with the lead shield in place with background radiation.  At the end of the case, lead shield was placed over the eye.  The patient tolerated the procedure well and was escorted to recovery.      PLAN:  The patient will be following up in 4 days for removal of the plaque.          MIRACLE LUNA MD             D: 2017 17:14   T: 2017 11:01   MT: [id number]      Name:     JENNIFER HOYOS   MRN:      [id number]        Account:        [id number]   :      1950           Procedure Date: 2017      Document: [id number]       cc: Miracle Luna MD

## 2017-08-26 NOTE — OP NOTE
DATE OF PROCEDURE:  2017      SURGEON:  Vamshi Suarez MD   COSURGEON:  Miracle Luna MD      PREOPERATIVE DIAGNOSIS:  Left eye choroidal melanoma.      POSTOPERATIVE DIAGNOSIS:  Left eye choroidal melanoma.      PROCEDURE: LEFT EYE CHOROIDAL MELANOMA RADIOACTIVE PLAQUE INSERTION     DESCRIPTION OF PROCEDURE:  Mr. Jennifer Hoyos is a gentleman with a diagnosis of left eye choroidal melanoma tumor.  This has been identified by Dr. Luna as having a basal diameter of the left eye tumor of 16 x 10 mm with of 4.7 mm apical height.  Treatment options were reviewed and discussed.  Radioactive I 125 plaque brachytherapy was outlined.  A 20 mm left eye plaque was prepared containing 24 radioactive I 125 seeds with a total activity of 60.17 mCi radioactive I 125.  The patient was brought to the operating room.  Anesthesia was accomplished.  Dr. Luna localized the left eye tumor and the tumor was surgically placed by Dr. Luna at 4:34 p.m. on 2017.  This is for the anticipated 96 hour implant with removal on 2017 with a targeted dose to the apex of tumor of 8335 cGy.  The patient tolerated the surgical placement moderately well.  Subsequent confirmational x-ray was obtained demonstrating the left eye plaque to be in place.  The patient tolerated this moderately well.  He subsequently was discharged to home after recovering from anesthetic and being given radiation precautions and instructions as noted.  I reviewed and approved the x-ray film demonstrating the left eye plaque placement.  The patient tolerated this moderately well.         VAMSHI SUAREZ MD             D: 2017 17:33   T: 2017 21:10   MT: [id number]      Name:     JENNIFER HOYOS   MRN:      [id number]        Account:        [id number]   :      1950           Procedure Date: 2017      Document: [id number]       cc: Miracle Suarez MD

## 2017-08-27 ENCOUNTER — ANESTHESIA EVENT (OUTPATIENT)
Dept: SURGERY | Facility: CLINIC | Age: 67
End: 2017-08-27
Payer: MEDICARE

## 2017-08-28 ENCOUNTER — TRANSFERRED RECORDS (OUTPATIENT)
Dept: HEALTH INFORMATION MANAGEMENT | Facility: CLINIC | Age: 67
End: 2017-08-28

## 2017-08-28 ENCOUNTER — HOSPITAL ENCOUNTER (OUTPATIENT)
Dept: NUCLEAR MEDICINE | Facility: CLINIC | Age: 67
Setting detail: NUCLEAR MEDICINE
End: 2017-08-28
Attending: OPHTHALMOLOGY
Payer: MEDICARE

## 2017-08-28 ENCOUNTER — HOSPITAL ENCOUNTER (OUTPATIENT)
Facility: CLINIC | Age: 67
Discharge: HOME | End: 2017-08-28
Attending: OPHTHALMOLOGY | Admitting: OPHTHALMOLOGY
Payer: MEDICARE

## 2017-08-28 ENCOUNTER — ANESTHESIA (OUTPATIENT)
Dept: SURGERY | Facility: CLINIC | Age: 67
End: 2017-08-28
Payer: MEDICARE

## 2017-08-28 VITALS
RESPIRATION RATE: 16 BRPM | TEMPERATURE: 97.3 F | OXYGEN SATURATION: 100 % | DIASTOLIC BLOOD PRESSURE: 89 MMHG | SYSTOLIC BLOOD PRESSURE: 141 MMHG

## 2017-08-28 DIAGNOSIS — C69.92 OCULAR MELANOMA, LEFT (H): ICD-10-CM

## 2017-08-28 PROCEDURE — S0020 INJECTION, BUPIVICAINE HYDRO: HCPCS | Performed by: OPHTHALMOLOGY

## 2017-08-28 PROCEDURE — 37000009 ZZH ANESTHESIA TECHNICAL FEE, EACH ADDTL 15 MIN: Performed by: OPHTHALMOLOGY

## 2017-08-28 PROCEDURE — 25000125 ZZHC RX 250: Performed by: ANESTHESIOLOGY

## 2017-08-28 PROCEDURE — 25000128 H RX IP 250 OP 636: Performed by: OPHTHALMOLOGY

## 2017-08-28 PROCEDURE — 37000008 ZZH ANESTHESIA TECHNICAL FEE, 1ST 30 MIN: Performed by: OPHTHALMOLOGY

## 2017-08-28 PROCEDURE — 25000125 ZZHC RX 250: Performed by: OPHTHALMOLOGY

## 2017-08-28 PROCEDURE — 27210995 ZZH RX 272: Performed by: OPHTHALMOLOGY

## 2017-08-28 PROCEDURE — 36000101 ZZH EYE SURGERY LEVEL 3 1ST 30 MIN: Performed by: OPHTHALMOLOGY

## 2017-08-28 PROCEDURE — A9270 NON-COVERED ITEM OR SERVICE: HCPCS | Performed by: OPHTHALMOLOGY

## 2017-08-28 PROCEDURE — 25000125 ZZHC RX 250: Performed by: NURSE ANESTHETIST, CERTIFIED REGISTERED

## 2017-08-28 PROCEDURE — 27210794 ZZH OR GENERAL SUPPLY STERILE: Performed by: OPHTHALMOLOGY

## 2017-08-28 PROCEDURE — 71000004 ZZH RECOVERY EYE PHASE 1 LEVEL 1 FIRST HR: Performed by: OPHTHALMOLOGY

## 2017-08-28 PROCEDURE — 25000566 ZZH SEVOFLURANE, EA 15 MIN: Performed by: OPHTHALMOLOGY

## 2017-08-28 PROCEDURE — 25000128 H RX IP 250 OP 636: Performed by: NURSE ANESTHETIST, CERTIFIED REGISTERED

## 2017-08-28 PROCEDURE — 25000128 H RX IP 250 OP 636: Performed by: ANESTHESIOLOGY

## 2017-08-28 PROCEDURE — 40000170 ZZH STATISTIC PRE-PROCEDURE ASSESSMENT II: Performed by: OPHTHALMOLOGY

## 2017-08-28 PROCEDURE — 71000028 ZZH EYE RECOVERY PHASE 2 EACH 15 MINS: Performed by: OPHTHALMOLOGY

## 2017-08-28 PROCEDURE — 36000102 ZZH EYE SURGERY LEVEL 3 EA 15 ADDTL MIN: Performed by: OPHTHALMOLOGY

## 2017-08-28 RX ORDER — FENTANYL CITRATE 50 UG/ML
INJECTION, SOLUTION INTRAMUSCULAR; INTRAVENOUS PRN
Status: DISCONTINUED | OUTPATIENT
Start: 2017-08-28 | End: 2017-08-28

## 2017-08-28 RX ORDER — PROPOFOL 10 MG/ML
INJECTION, EMULSION INTRAVENOUS CONTINUOUS PRN
Status: DISCONTINUED | OUTPATIENT
Start: 2017-08-28 | End: 2017-08-28

## 2017-08-28 RX ORDER — LIDOCAINE HYDROCHLORIDE 20 MG/ML
INJECTION, SOLUTION INFILTRATION; PERINEURAL PRN
Status: DISCONTINUED | OUTPATIENT
Start: 2017-08-28 | End: 2017-08-28

## 2017-08-28 RX ORDER — HYDROCODONE BITARTRATE AND ACETAMINOPHEN 5; 325 MG/1; MG/1
1 TABLET ORAL EVERY 6 HOURS PRN
COMMUNITY

## 2017-08-28 RX ORDER — BUPIVACAINE HYDROCHLORIDE 7.5 MG/ML
INJECTION, SOLUTION EPIDURAL; RETROBULBAR PRN
Status: DISCONTINUED | OUTPATIENT
Start: 2017-08-28 | End: 2017-08-28 | Stop reason: HOSPADM

## 2017-08-28 RX ORDER — PROPOFOL 10 MG/ML
INJECTION, EMULSION INTRAVENOUS PRN
Status: DISCONTINUED | OUTPATIENT
Start: 2017-08-28 | End: 2017-08-28

## 2017-08-28 RX ORDER — DEXAMETHASONE SODIUM PHOSPHATE 4 MG/ML
INJECTION, SOLUTION INTRA-ARTICULAR; INTRALESIONAL; INTRAMUSCULAR; INTRAVENOUS; SOFT TISSUE PRN
Status: DISCONTINUED | OUTPATIENT
Start: 2017-08-28 | End: 2017-08-28 | Stop reason: HOSPADM

## 2017-08-28 RX ORDER — BALANCED SALT SOLUTION 6.4; .75; .48; .3; 3.9; 1.7 MG/ML; MG/ML; MG/ML; MG/ML; MG/ML; MG/ML
SOLUTION OPHTHALMIC PRN
Status: DISCONTINUED | OUTPATIENT
Start: 2017-08-28 | End: 2017-08-28 | Stop reason: HOSPADM

## 2017-08-28 RX ORDER — ONDANSETRON 2 MG/ML
INJECTION INTRAMUSCULAR; INTRAVENOUS PRN
Status: DISCONTINUED | OUTPATIENT
Start: 2017-08-28 | End: 2017-08-28

## 2017-08-28 RX ORDER — CYCLOPENTOLATE HYDROCHLORIDE 10 MG/ML
1 SOLUTION/ DROPS OPHTHALMIC
Status: COMPLETED | OUTPATIENT
Start: 2017-08-28 | End: 2017-08-28

## 2017-08-28 RX ORDER — ATROPINE SULFATE 10 MG/ML
SOLUTION/ DROPS OPHTHALMIC PRN
Status: DISCONTINUED | OUTPATIENT
Start: 2017-08-28 | End: 2017-08-28 | Stop reason: HOSPADM

## 2017-08-28 RX ORDER — SODIUM CHLORIDE, SODIUM LACTATE, POTASSIUM CHLORIDE, CALCIUM CHLORIDE 600; 310; 30; 20 MG/100ML; MG/100ML; MG/100ML; MG/100ML
INJECTION, SOLUTION INTRAVENOUS CONTINUOUS
Status: DISCONTINUED | OUTPATIENT
Start: 2017-08-28 | End: 2017-08-28 | Stop reason: HOSPADM

## 2017-08-28 RX ORDER — PHENYLEPHRINE HYDROCHLORIDE 25 MG/ML
1 SOLUTION/ DROPS OPHTHALMIC
Status: COMPLETED | OUTPATIENT
Start: 2017-08-28 | End: 2017-08-28

## 2017-08-28 RX ORDER — LIDOCAINE 40 MG/G
CREAM TOPICAL
Status: DISCONTINUED | OUTPATIENT
Start: 2017-08-28 | End: 2017-08-28 | Stop reason: HOSPADM

## 2017-08-28 RX ORDER — PROPARACAINE HYDROCHLORIDE 5 MG/ML
1 SOLUTION/ DROPS OPHTHALMIC ONCE
Status: COMPLETED | OUTPATIENT
Start: 2017-08-28 | End: 2017-08-28

## 2017-08-28 RX ADMIN — MIDAZOLAM HYDROCHLORIDE 2 MG: 1 INJECTION, SOLUTION INTRAMUSCULAR; INTRAVENOUS at 15:24

## 2017-08-28 RX ADMIN — PROPARACAINE HYDROCHLORIDE 1 DROP: 5 SOLUTION/ DROPS OPHTHALMIC at 13:32

## 2017-08-28 RX ADMIN — CYCLOPENTOLATE HYDROCHLORIDE 1 DROP: 10 SOLUTION/ DROPS OPHTHALMIC at 13:32

## 2017-08-28 RX ADMIN — PHENYLEPHRINE HYDROCHLORIDE 1 DROP: 2.5 SOLUTION/ DROPS OPHTHALMIC at 13:39

## 2017-08-28 RX ADMIN — LIDOCAINE HYDROCHLORIDE 1 ML: 10 INJECTION, SOLUTION EPIDURAL; INFILTRATION; INTRACAUDAL; PERINEURAL at 13:40

## 2017-08-28 RX ADMIN — PROPOFOL 180 MCG/KG/MIN: 10 INJECTION, EMULSION INTRAVENOUS at 15:28

## 2017-08-28 RX ADMIN — PHENYLEPHRINE HYDROCHLORIDE 100 MCG: 10 INJECTION, SOLUTION INTRAMUSCULAR; INTRAVENOUS; SUBCUTANEOUS at 15:32

## 2017-08-28 RX ADMIN — PROPOFOL 160 MG: 10 INJECTION, EMULSION INTRAVENOUS at 15:24

## 2017-08-28 RX ADMIN — PHENYLEPHRINE HYDROCHLORIDE 1 DROP: 2.5 SOLUTION/ DROPS OPHTHALMIC at 13:32

## 2017-08-28 RX ADMIN — LIDOCAINE HYDROCHLORIDE 100 MG: 20 INJECTION, SOLUTION INFILTRATION; PERINEURAL at 15:24

## 2017-08-28 RX ADMIN — CYCLOPENTOLATE HYDROCHLORIDE 1 DROP: 10 SOLUTION/ DROPS OPHTHALMIC at 13:41

## 2017-08-28 RX ADMIN — CYCLOPENTOLATE HYDROCHLORIDE 1 DROP: 10 SOLUTION/ DROPS OPHTHALMIC at 13:39

## 2017-08-28 RX ADMIN — FENTANYL CITRATE 50 MCG: 50 INJECTION, SOLUTION INTRAMUSCULAR; INTRAVENOUS at 15:24

## 2017-08-28 RX ADMIN — PROPOFOL: 10 INJECTION, EMULSION INTRAVENOUS at 16:13

## 2017-08-28 RX ADMIN — ONDANSETRON 4 MG: 2 INJECTION INTRAMUSCULAR; INTRAVENOUS at 15:33

## 2017-08-28 RX ADMIN — PHENYLEPHRINE HYDROCHLORIDE 1 DROP: 2.5 SOLUTION/ DROPS OPHTHALMIC at 13:40

## 2017-08-28 RX ADMIN — PHENYLEPHRINE HYDROCHLORIDE 100 MCG: 10 INJECTION, SOLUTION INTRAMUSCULAR; INTRAVENOUS; SUBCUTANEOUS at 16:23

## 2017-08-28 RX ADMIN — SODIUM CHLORIDE, POTASSIUM CHLORIDE, SODIUM LACTATE AND CALCIUM CHLORIDE: 600; 310; 30; 20 INJECTION, SOLUTION INTRAVENOUS at 13:40

## 2017-08-28 RX ADMIN — PHENYLEPHRINE HYDROCHLORIDE 100 MCG: 10 INJECTION, SOLUTION INTRAMUSCULAR; INTRAVENOUS; SUBCUTANEOUS at 16:29

## 2017-08-28 RX ADMIN — PHENYLEPHRINE HYDROCHLORIDE 100 MCG: 10 INJECTION, SOLUTION INTRAMUSCULAR; INTRAVENOUS; SUBCUTANEOUS at 16:04

## 2017-08-28 RX ADMIN — SODIUM CHLORIDE, POTASSIUM CHLORIDE, SODIUM LACTATE AND CALCIUM CHLORIDE: 600; 310; 30; 20 INJECTION, SOLUTION INTRAVENOUS at 16:17

## 2017-08-28 ASSESSMENT — LIFESTYLE VARIABLES: TOBACCO_USE: 1

## 2017-08-28 ASSESSMENT — ENCOUNTER SYMPTOMS
ORTHOPNEA: 0
DYSRHYTHMIAS: 0
SEIZURES: 0

## 2017-08-28 ASSESSMENT — COPD QUESTIONNAIRES: COPD: 0

## 2017-08-28 NOTE — DISCHARGE INSTRUCTIONS
Same Day Surgery Discharge Instructions for  Sedation and General Anesthesia       It's not unusual to feel dizzy, light-headed or faint for up to 24 hours after surgery or while taking pain medication.  If you have these symptoms: sit for a few minutes before standing and have someone assist you when you get up to walk or use the bathroom.      You should rest and relax for the next 24 hours. We recommend you make arrangements to have an adult stay with you for at least 24 hours after your discharge.  Avoid hazardous and strenuous activity.      DO NOT DRIVE any vehicle or operate mechanical equipment for 24 hours following the end of your surgery.  Even though you may feel normal, your reactions may be affected by the medication you have received.      Do not drink alcoholic beverages for 24 hours following surgery.       Slowly progress to your regular diet as you feel able. It's not unusual to feel nauseated and/or vomit after receiving anesthesia.  If you develop these symptoms, drink clear liquids (apple juice, ginger ale, broth, 7-up, etc. ) until you feel better.  If your nausea and vomiting persists for 24 hours, please notify your surgeon.        All narcotic pain medications, along with inactivity and anesthesia, can cause constipation. Drinking plenty of liquids and increasing fiber intake will help.      For any questions of a medical nature, call your surgeon.      Do not make important decisions for 24 hours.      If you had general anesthesia, you may have a sore throat for a couple of days related to the breathing tube used during surgery.  You may use Cepacol lozenges to help with this discomfort.  If it worsens or if you develop a fever, contact your surgeon.       If you feel your pain is not well managed with the pain medications prescribed by your surgeon, please contact your surgeon's office to let them know so they can address your concerns.

## 2017-08-28 NOTE — ANESTHESIA PREPROCEDURE EVALUATION
Procedure: Procedure(s):  REMOVE PLAQUE RADIOACTIVE  Preop diagnosis: MASS    No Known Allergies  Past Medical History:   Diagnosis Date     Alcohol abuse      Amblyopia      Arthritis     osteoarthritis     Elevated PSA      Gastroesophageal reflux disease      Knee pain      Mood disorder (H)      Raynaud's disease      Rosacea      Sensorineural hearing loss      Tinnitus      Tobacco use disorder      Past Surgical History:   Procedure Laterality Date     ARTHROSCOPY SHOULDER RT/LT  1/07    decompression left shoulder     COLONOSCOPY  12/23/2004    Colonoscopy and cautery of polyps recommend followup in 10 years.     INSERT PLAQUE RADIOACTIVE Left 8/24/2017    Procedure: INSERT PLAQUE RADIOACTIVE;  LEFT EYE INSERTION  PLAQUE;  Surgeon: Miracle Luna MD;  Location:  [facility]     VASECTOMY  1989     Prior to Admission medications    Medication Sig Start Date End Date Taking? Authorizing Provider   HYDROcodone-acetaminophen (NORCO) 5-325 MG per tablet Take 1 tablet by mouth every 6 hours as needed for moderate to severe pain   Yes Reported, Patient   atropine 1 % ophthalmic solution Apply 1 drop to eye At Bedtime Instill into the operative eye(s) as directed per physician instructions. 8/24/17  Yes Miracle Luna MD   prednisoLONE acetate (PRED FORTE) 1 % ophthalmic susp Apply 1 drop to eye 4 times daily Instill into operative eye(s) per physician instructions. 8/24/17  Yes Miracle Luna MD   brimonidine (ALPHAGAN) 0.2 % ophthalmic solution Place 1 drop Into the left eye 2 times daily   Yes Reported, Patient   naproxen sodium (ALEVE) 220 MG capsule Take 250 mg by mouth 2 times daily as needed    Yes Reported, Patient   dorzolamide-timolol (COSOPT) 2-0.5 % ophthalmic solution Place 1 drop Into the left eye 2 times daily    Reported, Patient   UNABLE TO FIND Place 1 drop Into the left eye 2 times daily Pt is taking anti-pressure eyedrops, but does not remember the name.    Reported,  Patient     Current Facility-Administered Medications Ordered in Epic   Medication Dose Route Frequency Last Rate Last Dose     lidocaine 1 % 1 mL  1 mL Other Q1H PRN   1 mL at 08/28/17 1340     lidocaine (LMX4) cream   Topical Q1H PRN         sodium chloride (PF) 0.9% PF flush 3 mL  3 mL Intracatheter Q1H PRN         sodium chloride (PF) 0.9% PF flush 3 mL  3 mL Intracatheter Q8H         lactated ringers infusion   Intravenous Continuous 25 mL/hr at 08/28/17 1340       No current [facility]-ordered outpatient prescriptions on file.     Wt Readings from Last 1 Encounters:   08/23/17 72.6 kg (160 lb 1.6 oz)     Temp Readings from Last 1 Encounters:   08/28/17 36.6  C (97.8  F) (Temporal)     BP Readings from Last 6 Encounters:   08/28/17 150/90   08/24/17 128/76   07/10/17 138/85   06/30/17 129/77   11/11/13 145/86   12/12/12 114/62     Pulse Readings from Last 4 Encounters:   07/10/17 69   06/30/17 68   11/11/13 81   12/12/12 70     Resp Readings from Last 1 Encounters:   08/28/17 16     SpO2 Readings from Last 1 Encounters:   08/28/17 99%     Recent Labs   Lab Test  08/24/17   1528  07/10/17   1327  01/13/10   0733   NA  140  137  141   POTASSIUM  4.2  4.2  5.5*   CHLORIDE   --   106  103   CO2   --   24  32   ANIONGAP   --   6  6   GLC   --   106*  112*   BUN   --   13  17   CR   --   0.94  1.08   IRENE   --   8.8  9.9     Recent Labs   Lab Test  07/10/17   1327   AST  16   ALT  29     Recent Labs   Lab Test  08/24/17   1528  07/10/17   1327   WBC   --   6.5   HGB  16.0  16.1   PLT   --   235       Anesthesia Evaluation     . Pt has had prior anesthetic.     No history of anesthetic complications          ROS/MED HX    ENT/Pulmonary:     (+)tobacco use, Past use , . .   (-) asthma, COPD, sleep apnea and recent URI   Neurologic:      (-) seizures and CVA   Cardiovascular:        (-) angina, hypertension, CAD, orthopnea/PND, syncope, arrhythmias, irregular heartbeat/palpitations, valvular problems/murmurs and angina    METS/Exercise Tolerance:  >4 METS   Hematologic:     (+) Other Hematologic Disorder-Raynaud's      Musculoskeletal:   (+) arthritis, , , -       GI/Hepatic:     (+) GERD (asymptomatic - had EGD which showed esophageal changes potentially indicating GERD)      (-) liver disease   Renal/Genitourinary:      (-) renal disease   Endo:      (-) Type II DM, thyroid disease and chronic steroid usage   Psychiatric: Comment: EtOH abuse per chart.  He says he has a couple beers each day.  Never had withdrawals.  No liver problems per patient         Infectious Disease:         Malignancy:   (+) Malignancy (melanoma) History of Skin  Skin CA Active status post,         Other:                     Physical Exam  Normal systems: cardiovascular, pulmonary and dental    Airway   Mallampati: II  TM distance: >3 FB  Neck ROM: full    Dental     Cardiovascular       Pulmonary                         Anesthesia Plan      History & Physical Review  History and physical reviewed and following examination; no interval change.    ASA Status:  3 .    NPO Status:  > 8 hours    Plan for General and LMA with Intravenous induction. Maintenance will be Balanced.    PONV prophylaxis:  Ondansetron (or other 5HT-3)       Postoperative Care  Postoperative pain management:  IV analgesics.      Consents  Anesthetic plan, risks, benefits and alternatives discussed with:  Patient..

## 2017-08-28 NOTE — ANESTHESIA CARE TRANSFER NOTE
Patient: Jake Araiza    Procedure(s):  LEFT EYE REMOVAL OF I-125 PLAQUE - Wound Class: I-Clean    Diagnosis: MASS  Diagnosis Additional Information: No value filed.    Anesthesia Type:   General, LMA     Note:  Airway :Nasal Cannula  Patient transferred to:PACU  Comments: Patient's history & intra-op course report to PACU RN, questions answered.        Vitals: (Last set prior to Anesthesia Care Transfer)    CRNA VITALS  8/28/2017 1606 - 8/28/2017 1640      8/28/2017             Pulse: 76    SpO2: 99 %    Resp Rate (observed): (!)  1    Resp Rate (set): 10                Electronically Signed By: ESPERANZA Connors CRNA  August 28, 2017  4:40 PM

## 2017-08-28 NOTE — IP AVS SNAPSHOT
MRN:[id number]                      After Visit Summary   8/28/2017    Jake Araiza    MRN: [id number]           Thank you!     Thank you for choosing [facility] for your care. Our goal is always to provide you with excellent care. Hearing back from our patients is one way we can continue to improve our services. Please take a few minutes to complete the written survey that you may receive in the mail after you visit with us. Thank you!        Patient Information     Date Of Birth          1950        About your hospital stay     You were admitted on:  August 28, 2017 You last received care in the:  [facility]    You were discharged on:  August 28, 2017       Who to Call     For medical emergencies, please call 911.  For non-urgent questions about your medical care, please call your primary care provider or clinic, [phone]  For questions related to your surgery, please call your surgery clinic        Attending Provider     Provider Specialty    Miracle Luna MD Ophthalmology       Primary Care Provider Office Phone # Fax #    RADHA Jean Carlos Jaimes [phone]      After Care Instructions     Activity       Avoid strenuous activities the next several days.            Discharge Instructions - Wear eye patch and eye shield        Can start drops tomorrow morning. Pt has Pred forte and atropine drops already and can use them tomorrow.                  Follow-up Appointments     Follow Up       To see surgeon tomorrow.  Bring all eye medications to follow up visit.                  Your next 10 appointments already scheduled     Oct 04, 2017 10:00 AM CDT   PET ONCOLOGY WHOLE BODY with [id number]   [facility] Pet CT ([facility])    [address] [address]   [phone]           Tell your doctor:   If there is any chance you may be pregnant or if you are breastfeeding.   If you have problems lying in small spaces  (claustrophobia). If you do, your doctor may give you medicine to help you relax. If you have diabetes:   Have your exam early in the morning. Your blood glucose will go up as the day goes by.   Your glucose level must be 180 or less at the start of the exam. Please take any medicines you need to ensure this blood glucose level. 24 hours before your scan: Don t do any heavy exercise. (No jogging, aerobics or other workouts.) Exercise will make your pictures less accurate. 6 hours before your scan:   Stop all food and liquids (except water).   Do not chew gum or suck on mints.   If you need to take medicine with food, you may take it with a few crackers.  Please call your Imaging Department at your exam site with any questions.            Oct 12, 2017  3:30 PM CDT   [facility] Lab Draw with  [facility] DRAW   [facility] Draw ([facility])    [address] [place] [id number] [phone]            Oct 12, 2017  4:00 PM CDT   (Arrive by 3:45 PM)   Return Visit with Asa Mcintosh MD   [facility] ([facility])    [address] [place] [id number] [phone]              Further instructions from your care team       Same Day Surgery Discharge Instructions for  Sedation and General Anesthesia       It's not unusual to feel dizzy, light-headed or faint for up to 24 hours after surgery or while taking pain medication.  If you have these symptoms: sit for a few minutes before standing and have someone assist you when you get up to walk or use the bathroom.      You should rest and relax for the next 24 hours. We recommend you make arrangements to have an adult stay with you for at least 24 hours after your discharge.  Avoid hazardous and strenuous activity.      DO NOT DRIVE any vehicle or operate mechanical equipment for 24 hours following the end of your surgery.  Even though you  "may feel normal, your reactions may be affected by the medication you have received.      Do not drink alcoholic beverages for 24 hours following surgery.       Slowly progress to your regular diet as you feel able. It's not unusual to feel nauseated and/or vomit after receiving anesthesia.  If you develop these symptoms, drink clear liquids (apple juice, ginger ale, broth, 7-up, etc. ) until you feel better.  If your nausea and vomiting persists for 24 hours, please notify your surgeon.        All narcotic pain medications, along with inactivity and anesthesia, can cause constipation. Drinking plenty of liquids and increasing fiber intake will help.      For any questions of a medical nature, call your surgeon.      Do not make important decisions for 24 hours.      If you had general anesthesia, you may have a sore throat for a couple of days related to the breathing tube used during surgery.  You may use Cepacol lozenges to help with this discomfort.  If it worsens or if you develop a fever, contact your surgeon.       If you feel your pain is not well managed with the pain medications prescribed by your surgeon, please contact your surgeon's office to let them know so they can address your concerns.           Pending Results     No orders found from 8/26/2017 to 8/29/2017.            Admission Information     Date & Time Provider Department Dept. Phone    8/28/2017 Miracle Luna MD [facility] [phone]      Your Vitals Were     Blood Pressure Temperature Respirations Pulse Oximetry          116/79 97.3  F (36.3  C) (Temporal) 16 96%        MyChart Information     [employer] lets you send messages to your doctor, view your test results, renew your prescriptions, schedule appointments and more. To sign up, go to [email or web address] . Click on \"Log in\" on the left side of the screen, which will take you to the Welcome page. Then click on \"Sign up Now\" on the right side of the page. "     You will be asked to enter the access code listed below, as well as some personal information. Please follow the directions to create your username and password.     Your access code is: [email or web address]  Expires: 2017  9:26 AM     Your access code will  in 90 days. If you need help or a new code, please call your [facility] clinic or [phone].        Care EveryWhere ID     This is your Care EveryWhere ID. This could be used by other organizations to access your [facility] medical records  [id number]        Equal Access to Services     [facility]: Hadii tracy busch hadasho Sobeverly, waaxda luqadaha, qaybta kaalmada pat, vicente noel . So [facility] [phone].    ATENCIÓN: Si habla español, tiene a waterman disposición servicios gratuitos de asistencia lingüística. Llame al [phone].    We comply with applicable federal civil rights laws and [place] laws. We do not discriminate on the basis of race, color, national origin, age, disability sex, sexual orientation or gender identity.               Review of your medicines      UNREVIEWED medicines. Ask your doctor about these medicines        Dose / Directions    ALEVE 220 MG capsule   Generic drug:  naproxen sodium        Dose:  250 mg   Take 250 mg by mouth 2 times daily as needed   Refills:  0       atropine 1 % ophthalmic solution   Used for:  Choroidal malignant melanoma, left (H)        Dose:  1 drop   Apply 1 drop to eye At Bedtime Instill into the operative eye(s) as directed per physician instructions.   Quantity:  5 mL   Refills:  0       brimonidine 0.2 % ophthalmic solution   Commonly known as:  ALPHAGAN        Dose:  1 drop   Place 1 drop Into the left eye 2 times daily   Refills:  0       dorzolamide-timolol 2-0.5 % ophthalmic solution   Commonly known as:  COSOPT        Dose:  1 drop   Place 1 drop Into the left eye 2 times daily   Refills:  0       HYDROcodone-acetaminophen 5-325 MG per tablet   Commonly known  as:  NORCO        Dose:  1 tablet   Take 1 tablet by mouth every 6 hours as needed for moderate to severe pain   Refills:  0       prednisoLONE acetate 1 % ophthalmic susp   Commonly known as:  PRED FORTE   Used for:  Choroidal malignant melanoma, left (H)        Dose:  1 drop   Apply 1 drop to eye 4 times daily Instill into operative eye(s) per physician instructions.   Quantity:  10 mL   Refills:  0       UNABLE TO FIND        Dose:  1 drop   Place 1 drop Into the left eye 2 times daily Pt is taking anti-pressure eyedrops, but does not remember the name.   Refills:  0                Protect others around you: Learn how to safely use, store and throw away your medicines at www.disposemymeds.org.             Medication List: This is a list of all your medications and when to take them. Check marks below indicate your daily home schedule. Keep this list as a reference.      Medications           Morning Afternoon Evening Bedtime As Needed    ALEVE 220 MG capsule   Take 250 mg by mouth 2 times daily as needed   Generic drug:  naproxen sodium                                atropine 1 % ophthalmic solution   Apply 1 drop to eye At Bedtime Instill into the operative eye(s) as directed per physician instructions.   Last time this was given:  1 drop on 8/28/2017  4:27 PM                                brimonidine 0.2 % ophthalmic solution   Commonly known as:  ALPHAGAN   Place 1 drop Into the left eye 2 times daily                                dorzolamide-timolol 2-0.5 % ophthalmic solution   Commonly known as:  COSOPT   Place 1 drop Into the left eye 2 times daily                                HYDROcodone-acetaminophen 5-325 MG per tablet   Commonly known as:  NORCO   Take 1 tablet by mouth every 6 hours as needed for moderate to severe pain                                prednisoLONE acetate 1 % ophthalmic susp   Commonly known as:  PRED FORTE   Apply 1 drop to eye 4 times daily Instill into operative eye(s) per  physician instructions.                                UNABLE TO FIND   Place 1 drop Into the left eye 2 times daily Pt is taking anti-pressure eyedrops, but does not remember the name.

## 2017-08-28 NOTE — ANESTHESIA POSTPROCEDURE EVALUATION
Patient: Jake Araiza    Procedure(s):  LEFT EYE REMOVAL OF I-125 PLAQUE - Wound Class: I-Clean    Diagnosis:MASS  Diagnosis Additional Information: No value filed.    Anesthesia Type:  General, LMA    Note:  Anesthesia Post Evaluation    Patient location during evaluation: PACU  Patient participation: Able to fully participate in evaluation  Level of consciousness: awake  Pain management: adequate  Airway patency: patent  Cardiovascular status: acceptable  Respiratory status: acceptable  Hydration status: acceptable  PONV: none     Anesthetic complications: None          Last vitals:  Vitals:    08/28/17 1643 08/28/17 1645 08/28/17 1700   BP: 116/75 116/79 (!) 128/91   Resp: 16 16 16   Temp: 36.3  C (97.3  F)     SpO2: 96% 96% 99%         Electronically Signed By: Giovanni Carmona MD  August 28, 2017  5:19 PM

## 2017-08-28 NOTE — BRIEF OP NOTE
[facility]  Ophthalmology Brief Operative Note    Pre-operative diagnosis: CILIOCHOROIDAL MALIGNANT MELANOMA OS   Post-operative diagnosis SAME   Procedure: Procedure(s):  REMOVE I-125 PLAQUE RADIOACTIVE  RELEASE OF SCAR TISSUE AND CLOSURE OF CONJUNCTIVA AND TENONS OVER EPISCLERAL EXTENSION OF TUMOR OS   Surgeon: Miracle Luna MD   Assistants(s): NONE   Anesthesia: General    Estimated blood loss: Less than 1 cc    Total IV fluids: (See anesthesia record)   Blood transfusion: No transfusion was given during surgery   Total urine output: (See anesthesia record)   Drains: None   Specimens: None   Implants: I-125 plaque removed and given to nuclear medicine at 15:48   Findings: Same as preop   Complications: None   Condition: Stable   Comments: See dictated operative report for full details

## 2017-08-28 NOTE — IP AVS SNAPSHOT
[facility]    [address]    [place] [address]    Phone:  [phone]                                       After Visit Summary   8/28/2017    Jake Araiza    MRN: [id number]           After Visit Summary Signature Page     I have received my discharge instructions, and my questions have been answered. I have discussed any challenges I see with this plan with the nurse or doctor.    ..........................................................................................................................................  Patient/Patient Representative Signature      ..........................................................................................................................................  Patient Representative Print Name and Relationship to Patient    ..................................................               ................................................  Date                                            Time    ..........................................................................................................................................  Reviewed by Signature/Title    ...................................................              ..............................................  Date                                                            Time

## 2017-08-29 NOTE — OP NOTE
DATE OF SERVICE:  08/28/2017.      PREOPERATIVE DIAGNOSES:   1.  Ciliochoroidal malignant melanoma, left eye.   2.  Extraocular extension, left eye.   3.  History of scleritis and uveitis, left eye.      POSTOPERATIVE DIAGNOSES:   1.  Ciliochoroidal malignant melanoma, left eye.   2.  Extraocular extension, left eye.   3.  History of scleritis and uveitis, left eye.      PROCEDURES:   1.  Inspection of globe, left eye.   2.  Removal of I-125 plaque, left eye, 24 seeds intact, with plaque passed off the operative field to nuclear medicine and subsequent cleared radiation counts in the surgical field in the eye after removal of the I-125 plaque, left eye.   3.  Subconjunctival injections of dexamethasone, Ancef, retrobulbar Marcaine 0.75% 3 mL, left eye.      ANESTHESIA:  General.      SURGEON:  Miracle Luna MD       ASSISTANT:  None.      COMPLICATIONS:  None.      FINDINGS:  Jake Araiza tolerated the procedure well, was escorted to the Recovery Room in stable condition.       INDICATIONS FOR THE PROCEDURE:  The patient was noted to have serial choroidal malignant melanoma, left eye, and extraocular extension with a subconjunctival nodule and lesion.  I discussed the risks and alternatives for I-125 plaque placement, and after meeting with Dr. Garsia, the patient opted for surgery with me with I-125 plaque placement at [facility] on 08/24/2017 and subsequent removal on 08/28/2017, and thus, plaque placement was uncomplicated, and thus, patient presented on 08/28/2017 for removal of the plaque.       DESCRIPTION OF PROCEDURE:  After proper consent was obtained, the patient was brought to the operating room at [facility] and the Anesthesia team placed the patient under general anesthesia.  The left eye was then prepped and draped in the usual sterile fashion for ophthalmic surgery including topical Betadine placed on the conjunctival surface.  A lid speculum was placed, the lids  opened, preparation made for removal of the plaque.  The prior conjunctival incision was opened.  The conjunctiva had been sewn over the plaque and over the entire cornea.  This was then removed by removing each of the sutures with a Cherise scissor and 0.12 forceps, then the corneal surface was then visualized and was intact.  The plaque sutures were then exposed by placing a muscle hook through the lateral rectus muscle and the globe was rotated nasally and then the globe was rotated nasally.  The superotemporal episcleral traction suture was then removed by severing the portion of the 5-0 Mersilene suture anterior to the eyelet in the superotemporal quadrant and then the suture was removed in its entirety.  The globe was then tilted temporally by using the muscle hook after it was placed around the medial rectus muscle and was rotated temporally and then the 3 sutures placed in the superolateral nasal quadrant were removed one at a time and each suture was removed in its entirety.  The area of the episcleral extension was noted to be stable after the surgery and great care was taken to re-oppose the conjunctiva without disturbing the conjunctival nodule at all.  The conjunctiva was closed using interrupted 7-0 Vicryl sutures and then a running 7-0 Vicryl suture.  After the I-125 plaque had been removed from the eye and was given to Nuclear Medicine, then the conjunctiva was closed.  The external scleral surface was inspected and found to be free of pathology otherwise and the plaque was removed in its entirety and was intact with 24 seeds and that was passed off the surgical tray to Nuclear Medicine.  The remainder of the conjunctiva was closed with interrupted 7-0 Vicryl sutures with buried knots.  The external scleral surface had been irrigated with Neosporin solution to prevent postop infection.      Then, subconjunctival injections of dexamethasone and Ancef were administered along with retrobulbar Marcaine  0.75% 3 mL for postop analgesia.  At the end of the case, intraocular pressure was palpated and found to be 15-20 mmHg.  The anterior chamber remained deep and formed.  Preexisting cataract remained and the plaque had been removed.  Radiation counts were back to normal, without any radiation on the surgical field or in the eye.  The patient tolerated the procedure well and was escorted to recovery in stable condition after being extubated by the Anesthesia team.  Atropine, Maxitrol ointment was placed on the surface along with a sterile patch and shield taped in place over the eye and the patient was escorted to recovery in stable condition to follow with me the following day for postop day 1 visit.         MIRACLE FINN MD             D: 2017 16:05   T: 2017 16:55   MT: TS      Name:     JENNIFER HOYOS   MRN:      [id number]        Account:        [id number]   :      1950           Procedure Date: 2017      Document: [id number]       cc: Miracle Garsia MD

## 2017-09-20 NOTE — PROGRESS NOTES
"   OUTPATIENT PHYSICAL THERAPY DISCHARGE SUMMARY    5/23/17 to 08/01/17 0700   Signing Clinician's Name / Credentials   Signing clinician's name / credentials Kamala Victor, PT [id number]   Session Number   Session Number 6 [facility]   Ortho Goal 1   Goal Description 1.  Pt will report increased ease w/ initial walking after sitting.  8/1/17 MET   Target Date 06/22/17   Ortho Goal 2   Goal Description 2.  Pt will be able to walk 1/2 mile w/ pain no > 4/10.  8/1/17 no complaints  MET   Target Date 07/22/17   Ortho Goal 3   Goal Description 3.  Pt will tolerate sitting in car X 20 min w/ hip pain no > 4/10.  7/18/17 3-4/10 MET.  8/1/17  no pain after 20 min will notice pain after an hour which improves in 2-3 steps MET   Target Date 07/22/17   Date Met 06/26/17   Ortho Goal 4   Goal Description 4. Independent and consistent w/ HEP 8/1/17 MET   Target Date 07/22/17   Subjective Report   Subjective Report Pt states he is doing very well.  R hip pain intermittent and less frequent only notices w/ driving > 1 hour then 4/10.    Knee pain @ best 2/10,  @ worst 6/10.  Pt notes increased pain w/ kneeling and using a kneeling pad.     Objective Measure    Objective Measure MMT:  B hip flex / hip abd/ quad / HS 5/5   Therapeutic Procedure/exercise   Patient Response Stretches as noted 6/26/17 on own  --Pt prefers supine piriformis stretch.       Treatment Detail R clam w/ band.  Single leg Bridges B.  .  Wall squats w/ RTB.    Monster walks w/ RTB at ankles .  Lateral step ups B 7\".     Plan   Home program ex as above   Plan  Pt to cont on own w/ HEP.  Discharge from physical therapy.   Comments   Comments Pt has met goals     "

## 2017-09-20 NOTE — ADDENDUM NOTE
Encounter addended by: Kamala Victor, PT on: 9/20/2017  8:40 AM<BR>     Actions taken: Sign clinical note, Flowsheet accepted, Episode resolved

## 2017-09-22 DIAGNOSIS — C69.32 CHOROID MELANOMA OF LEFT EYE (H): Primary | ICD-10-CM

## 2017-09-25 ENCOUNTER — TRANSFERRED RECORDS (OUTPATIENT)
Dept: HEALTH INFORMATION MANAGEMENT | Facility: CLINIC | Age: 67
End: 2017-09-25

## 2017-10-04 ENCOUNTER — HOSPITAL ENCOUNTER (OUTPATIENT)
Dept: PET IMAGING | Facility: CLINIC | Age: 67
End: 2017-10-04
Attending: INTERNAL MEDICINE
Payer: MEDICARE

## 2017-10-04 ENCOUNTER — HOSPITAL ENCOUNTER (OUTPATIENT)
Dept: PET IMAGING | Facility: CLINIC | Age: 67
Discharge: HOME | End: 2017-10-04
Attending: INTERNAL MEDICINE | Admitting: INTERNAL MEDICINE
Payer: MEDICARE

## 2017-10-04 DIAGNOSIS — C69.32 CHOROID MELANOMA OF LEFT EYE (H): ICD-10-CM

## 2017-10-04 PROCEDURE — A9552 F18 FDG: HCPCS | Performed by: INTERNAL MEDICINE

## 2017-10-04 PROCEDURE — 70491 CT SOFT TISSUE NECK W/DYE: CPT

## 2017-10-04 PROCEDURE — 34300033 ZZH RX 343: Performed by: INTERNAL MEDICINE

## 2017-10-04 PROCEDURE — 71260 CT THORAX DX C+: CPT

## 2017-10-04 PROCEDURE — 78816 PET IMAGE W/CT FULL BODY: CPT | Mod: PS

## 2017-10-04 PROCEDURE — 25000128 H RX IP 250 OP 636: Performed by: INTERNAL MEDICINE

## 2017-10-04 RX ORDER — IOPAMIDOL 755 MG/ML
1-135 INJECTION, SOLUTION INTRAVASCULAR ONCE
Status: COMPLETED | OUTPATIENT
Start: 2017-10-04 | End: 2017-10-04

## 2017-10-04 RX ADMIN — FLUDEOXYGLUCOSE F-18 12.5 MCI.: 500 INJECTION, SOLUTION INTRAVENOUS at 10:51

## 2017-10-04 RX ADMIN — IOPAMIDOL 100 ML: 755 INJECTION, SOLUTION INTRAVENOUS at 11:02

## 2017-10-16 ENCOUNTER — ONCOLOGY VISIT (OUTPATIENT)
Dept: ONCOLOGY | Facility: CLINIC | Age: 67
End: 2017-10-16
Attending: INTERNAL MEDICINE
Payer: MEDICARE

## 2017-10-16 VITALS
WEIGHT: 166.7 LBS | SYSTOLIC BLOOD PRESSURE: 156 MMHG | DIASTOLIC BLOOD PRESSURE: 92 MMHG | TEMPERATURE: 97.6 F | BODY MASS INDEX: 23.86 KG/M2 | RESPIRATION RATE: 16 BRPM | HEIGHT: 70 IN | HEART RATE: 62 BPM | OXYGEN SATURATION: 100 %

## 2017-10-16 DIAGNOSIS — C69.30 CHOROIDAL MALIGNANT MELANOMA (H): ICD-10-CM

## 2017-10-16 DIAGNOSIS — I73.9 PERIPHERAL VASCULAR DISEASE (H): ICD-10-CM

## 2017-10-16 DIAGNOSIS — C69.42 MALIGNANT MELANOMA OF UVEA OF LEFT EYE (H): Primary | ICD-10-CM

## 2017-10-16 LAB
ALBUMIN SERPL-MCNC: 3.9 G/DL (ref 3.4–5)
ALP SERPL-CCNC: 66 U/L (ref 40–150)
ALT SERPL W P-5'-P-CCNC: 23 U/L (ref 0–70)
ANION GAP SERPL CALCULATED.3IONS-SCNC: 6 MMOL/L (ref 3–14)
AST SERPL W P-5'-P-CCNC: 14 U/L (ref 0–45)
BASOPHILS # BLD AUTO: 0.1 10E9/L (ref 0–0.2)
BASOPHILS NFR BLD AUTO: 1.2 %
BILIRUB SERPL-MCNC: 0.8 MG/DL (ref 0.2–1.3)
BUN SERPL-MCNC: 14 MG/DL (ref 7–30)
CALCIUM SERPL-MCNC: 8.9 MG/DL (ref 8.5–10.1)
CHLORIDE SERPL-SCNC: 104 MMOL/L (ref 94–109)
CO2 SERPL-SCNC: 28 MMOL/L (ref 20–32)
CREAT SERPL-MCNC: 0.99 MG/DL (ref 0.66–1.25)
DIFFERENTIAL METHOD BLD: NORMAL
EOSINOPHIL # BLD AUTO: 0.1 10E9/L (ref 0–0.7)
EOSINOPHIL NFR BLD AUTO: 1.5 %
ERYTHROCYTE [DISTWIDTH] IN BLOOD BY AUTOMATED COUNT: 13.7 % (ref 10–15)
GFR SERPL CREATININE-BSD FRML MDRD: 76 ML/MIN/1.7M2
GLUCOSE SERPL-MCNC: 129 MG/DL (ref 70–99)
HCT VFR BLD AUTO: 45.8 % (ref 40–53)
HGB BLD-MCNC: 15.4 G/DL (ref 13.3–17.7)
IMM GRANULOCYTES # BLD: 0 10E9/L (ref 0–0.4)
IMM GRANULOCYTES NFR BLD: 0.2 %
LYMPHOCYTES # BLD AUTO: 1.7 10E9/L (ref 0.8–5.3)
LYMPHOCYTES NFR BLD AUTO: 28.8 %
MAGNESIUM SERPL-MCNC: 2 MG/DL (ref 1.6–2.3)
MCH RBC QN AUTO: 32.5 PG (ref 26.5–33)
MCHC RBC AUTO-ENTMCNC: 33.6 G/DL (ref 31.5–36.5)
MCV RBC AUTO: 97 FL (ref 78–100)
MONOCYTES # BLD AUTO: 0.4 10E9/L (ref 0–1.3)
MONOCYTES NFR BLD AUTO: 7.1 %
NEUTROPHILS # BLD AUTO: 3.7 10E9/L (ref 1.6–8.3)
NEUTROPHILS NFR BLD AUTO: 61.2 %
NRBC # BLD AUTO: 0 10*3/UL
NRBC BLD AUTO-RTO: 0 /100
PLATELET # BLD AUTO: 259 10E9/L (ref 150–450)
POTASSIUM SERPL-SCNC: 4.9 MMOL/L (ref 3.4–5.3)
PROT SERPL-MCNC: 7 G/DL (ref 6.8–8.8)
RBC # BLD AUTO: 4.74 10E12/L (ref 4.4–5.9)
SODIUM SERPL-SCNC: 138 MMOL/L (ref 133–144)
WBC # BLD AUTO: 6 10E9/L (ref 4–11)

## 2017-10-16 PROCEDURE — 99214 OFFICE O/P EST MOD 30 MIN: CPT | Mod: ZP | Performed by: INTERNAL MEDICINE

## 2017-10-16 PROCEDURE — 83735 ASSAY OF MAGNESIUM: CPT | Performed by: INTERNAL MEDICINE

## 2017-10-16 PROCEDURE — 80053 COMPREHEN METABOLIC PANEL: CPT | Performed by: INTERNAL MEDICINE

## 2017-10-16 PROCEDURE — 99212 OFFICE O/P EST SF 10 MIN: CPT | Mod: ZF

## 2017-10-16 PROCEDURE — 85025 COMPLETE CBC W/AUTO DIFF WBC: CPT | Performed by: INTERNAL MEDICINE

## 2017-10-16 ASSESSMENT — PAIN SCALES - GENERAL: PAINLEVEL: NO PAIN (0)

## 2017-10-16 NOTE — LETTER
10/16/2017       RE: Jake Araiza  [id number] [address] [place] [id number]     Dear Colleague,    Thank you for referring your patient, Jake Araiza, to the [facility]. Please see a copy of my visit note below.    ONCOLOGY PROGRESS NOTE  Oct 16, 2017    HISTORY OF PRESENT ILLNESS  Mr. 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 He was seen in the [facility], under Dr. Luna and Dr. Garsia in radiation oncology.    He had MRI brain and MRI liver on 7/8/17 for restaging, and both the brain and liver are negative for evidence of metastatic disease. He then underwent plaque brachytherapy treatment on 8/24/17.    Since then he has remained largely asymptomatic. He reports no changes in his vision, other than some stable blurred vision in the left eye. No flashes or floaters. No vision loss, pain or pressure. No loss of appetite, weight loss, abdominal pain, nausea, vomiting, or diarrhea.    He had PET-CT and that is negative for evidence of metastatic disease. Labs including complete blood count, renal function, and liver function tests are normal. Performance status is 0.    Current Outpatient Prescriptions   Medication Sig Dispense Refill     prednisoLONE acetate (PRED FORTE) 1 % ophthalmic susp Apply 1 drop to eye 4 times daily Instill into operative eye(s) per physician instructions. 10 mL 0     brimonidine (ALPHAGAN) 0.2 % ophthalmic solution Place 1 drop Into the left eye 2 times daily       dorzolamide-timolol (COSOPT) 2-0.5 % ophthalmic solution Place 1 drop Into the left eye 2 times daily       naproxen sodium (ALEVE) 220 MG capsule Take 250 mg by mouth 2 times daily as needed        HYDROcodone-acetaminophen (NORCO) 5-325 MG per tablet Take 1 tablet by mouth every 6 hours as needed for moderate to severe pain       atropine 1 % ophthalmic solution Apply 1 drop to eye At Bedtime Instill into the operative eye(s) as directed  "per physician instructions. (Patient not taking: Reported on 10/16/2017) 5 mL 0     UNABLE TO FIND Place 1 drop Into the left eye 2 times daily Pt is taking anti-pressure eyedrops, but does not remember the name.         REVIEW OF SYSTEMS  12-point ROS negative except as in HPI    PHYSICAL EXAMINATION  BP (!) 156/92  Pulse 62  Temp 97.6  F (36.4  C) (Oral)  Resp 16  Ht 1.778 m (5' 10\")  Wt 75.6 kg (166 lb 11.2 oz)  SpO2 100%  BMI 23.92 kg/m2  Wt Readings from Last 2 Encounters:   10/16/17 75.6 kg (166 lb 11.2 oz)   08/23/17 72.6 kg (160 lb 1.6 oz)     Physical Exam   Constitutional: He is oriented to person, place, and time. He appears well-developed and well-nourished. No distress.   HENT:   Mouth/Throat: Oropharynx is clear and moist.   Eyes: EOM are normal. Pupils are equal, round, and reactive to light. No scleral icterus.   Left lid droop   Pulmonary/Chest: Effort normal.   Musculoskeletal: Normal range of motion.   Neurological: He is alert and oriented to person, place, and time.   Skin: Skin is warm and dry. No erythema.   Psychiatric: He has a normal mood and affect. His behavior is normal.   Nursing note and vitals reviewed.      IMPRESSION AND PLAN  #1 Choroidal melanoma, left eye  Mr. Araiza presents today for follow-up and restaging studies. He is doing well. We reviewed the PET-CT, which is negative for evidence of metastatic disease. I will plan to see him back in December with restaging CT-CAP. He understands that observation imaging will be needed over the next 3-5 years to ensure the plaque brachytherapy treatment was successful.    Multiple questions answered. I will see him back in December before he leaves on a planned 2 month vacation.    Asa Fish M.D.   of Medicine  Hematology, Oncology and Transplantation  [facility]        "

## 2017-10-16 NOTE — NURSING NOTE
"Oncology Rooming Note    October 16, 2017 9:44 AM   Jake Araiza is a 67 year old male who presents for:    Chief Complaint   Patient presents with     Blood Draw     vpt     Oncology Clinic Visit     Chroidal Melanoma F/U     Initial Vitals: BP (!) 156/92  Pulse 62  Temp 97.6  F (36.4  C) (Oral)  Resp 16  Ht 1.778 m (5' 10\")  Wt 75.6 kg (166 lb 11.2 oz)  SpO2 100%  BMI 23.92 kg/m2 Estimated body mass index is 23.92 kg/(m^2) as calculated from the following:    Height as of this encounter: 1.778 m (5' 10\").    Weight as of this encounter: 75.6 kg (166 lb 11.2 oz). Body surface area is 1.93 meters squared.  No Pain (0) Comment: Data Unavailable   No LMP for male patient.  Allergies reviewed: Yes  Medications reviewed: Yes    Medications: Medication refills not needed today.  Pharmacy name entered into [facility]: [facility] - [place], [place] - [facility] [address]    Clinical concerns: no clinical concerns  provider was notified.    7 minutes for nursing intake (face to face time)     Tracy Holly CMA              "

## 2017-10-16 NOTE — NURSING NOTE
Chief Complaint   Patient presents with     Blood Draw     vpt     Vitals done and labs drawn, see flow sheets.  ERICK COLLIER, CMA

## 2017-10-16 NOTE — PROGRESS NOTES
ONCOLOGY PROGRESS NOTE  Oct 16, 2017    HISTORY OF PRESENT ILLNESS  Mr. 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 He was seen in the [facility], under Dr. Luna and Dr. Garsia in radiation oncology.    He had MRI brain and MRI liver on 7/8/17 for restaging, and both the brain and liver are negative for evidence of metastatic disease. He then underwent plaque brachytherapy treatment on 8/24/17.    Since then he has remained largely asymptomatic. He reports no changes in his vision, other than some stable blurred vision in the left eye. No flashes or floaters. No vision loss, pain or pressure. No loss of appetite, weight loss, abdominal pain, nausea, vomiting, or diarrhea.    He had PET-CT and that is negative for evidence of metastatic disease. Labs including complete blood count, renal function, and liver function tests are normal. Performance status is 0.    Current Outpatient Prescriptions   Medication Sig Dispense Refill     prednisoLONE acetate (PRED FORTE) 1 % ophthalmic susp Apply 1 drop to eye 4 times daily Instill into operative eye(s) per physician instructions. 10 mL 0     brimonidine (ALPHAGAN) 0.2 % ophthalmic solution Place 1 drop Into the left eye 2 times daily       dorzolamide-timolol (COSOPT) 2-0.5 % ophthalmic solution Place 1 drop Into the left eye 2 times daily       naproxen sodium (ALEVE) 220 MG capsule Take 250 mg by mouth 2 times daily as needed        HYDROcodone-acetaminophen (NORCO) 5-325 MG per tablet Take 1 tablet by mouth every 6 hours as needed for moderate to severe pain       atropine 1 % ophthalmic solution Apply 1 drop to eye At Bedtime Instill into the operative eye(s) as directed per physician instructions. (Patient not taking: Reported on 10/16/2017) 5 mL 0     UNABLE TO FIND Place 1 drop Into the left eye 2 times daily Pt is taking anti-pressure eyedrops, but does not remember the name.         REVIEW OF  "SYSTEMS  12-point ROS negative except as in HPI    PHYSICAL EXAMINATION  BP (!) 156/92  Pulse 62  Temp 97.6  F (36.4  C) (Oral)  Resp 16  Ht 1.778 m (5' 10\")  Wt 75.6 kg (166 lb 11.2 oz)  SpO2 100%  BMI 23.92 kg/m2  Wt Readings from Last 2 Encounters:   10/16/17 75.6 kg (166 lb 11.2 oz)   08/23/17 72.6 kg (160 lb 1.6 oz)     Physical Exam   Constitutional: He is oriented to person, place, and time. He appears well-developed and well-nourished. No distress.   HENT:   Mouth/Throat: Oropharynx is clear and moist.   Eyes: EOM are normal. Pupils are equal, round, and reactive to light. No scleral icterus.   Left lid droop   Pulmonary/Chest: Effort normal.   Musculoskeletal: Normal range of motion.   Neurological: He is alert and oriented to person, place, and time.   Skin: Skin is warm and dry. No erythema.   Psychiatric: He has a normal mood and affect. His behavior is normal.   Nursing note and vitals reviewed.      IMPRESSION AND PLAN  #1 Choroidal melanoma, left eye  Mr. Araiza presents today for follow-up and restaging studies. He is doing well. We reviewed the PET-CT, which is negative for evidence of metastatic disease. I will plan to see him back in December with restaging CT-CAP. He understands that observation imaging will be needed over the next 3-5 years to ensure the plaque brachytherapy treatment was successful.    Multiple questions answered. I will see him back in December before he leaves on a planned 2 month vacation.    Asa Fish M.D.   of Medicine  Hematology, Oncology and Transplantation  [facility]      "

## 2017-10-16 NOTE — MR AVS SNAPSHOT
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 Patient should be prepared to give a urine specimen  Please do not eat 10-12 hours before your appointment if you are coming in fasting for labs on lipids, cholesterol, or glucose (sugar). Pregnant women should follow their Care Team instructions. Water with medications is okay. Do not drink coffee or other fluids. If you have concerns about taking  your medications, please ask at office or if scheduling via [employer], send a message by clicking on Secure Messaging, Message Your Care Team.            Dec 13, 2017 10:45 AM CST   CT CHEST ABDOMEN PELVIS W/O & W CONTRAST with [facility]   [facility] ([facility])    [address] [address]   [phone]           Please bring any scans or X-rays taken at other hospitals, if similar tests were done. Also bring a list of your medicines, including vitamins, minerals and over-the-counter drugs. It is safest to leave personal items at home.  Be sure to tell your doctor:   If you have any allergies.   If there s any chance you are pregnant.   If you are breastfeeding.   If you have any special needs.  You may have contrast for this exam. To prepare:   Do not eat or drink for 2 hours before your exam. If you need to take medicine, you may take it with small sips of water. (We may ask you to  take liquid medicine as well.)   The day before your exam, drink extra fluids at least six 8-ounce glasses (unless your doctor tells you to restrict your fluids).  Patients over 70 or patients with diabetes or kidney problems:   If you haven t had a blood test (creatinine test) within the last 30 days, go to your clinic or Diagnostic Imaging Department for this test.  If you have diabetes:   If your kidney function is normal, continue taking your metformin (Avandamet, Glucophage, Glucovance, Metaglip) on the day of your exam.   If your kidney function is abnormal, wait 48 hours before restarting this medicine.  You will have oral contrast for this exam:   You will drink the contrast at home. Get this from your clinic or Diagnostic Imaging Department. Please follow the directions given.  Please wear loose clothing, such as a sweat suit or jogging clothes. Avoid snaps, zippers and other metal. We may ask you to undress and put on a hospital gown.  If you have any questions, please call the Imaging Department where you will have your exam.            Dec 18, 2017  9:30 AM CST   (Arrive by 9:15 AM)   Return Visit with Asa Mcintosh MD   [facility] ([facility])    [address] [facility] [place] [address] [phone]              Future tests that were ordered for you today     Open Future Orders        Priority Expected Expires Ordered    CT Chest Abdomen Pelvis w/o & w Contrast Routine  10/16/2018 10/16/2017            Who to contact     If you have questions or need follow up information about today's clinic visit or your schedule please contact [facility] directly at [phone].  Normal or non-critical lab and imaging results will be communicated to you by MyChart, letter or phone within 4 business days after the clinic has received the results. If you do not hear from us within 7 days, please contact the clinic through  "[employer] or phone. If you have a critical or abnormal lab result, we will notify you by phone as soon as possible.  Submit refill requests through [employer] or call your pharmacy and they will forward the refill request to us. Please allow 3 business days for your refill to be completed.          Additional Information About Your Visit        [employer] Information     [employer] lets you send messages to your doctor, view your test results, renew your prescriptions, schedule appointments and more. To sign up, go to [email or web address] . Click on \"Log in\" on the left side of the screen, which will take you to the Welcome page. Then click on \"Sign up Now\" on the right side of the page.     You will be asked to enter the access code listed below, as well as some personal information. Please follow the directions to create your username and password.     Your access code is: [email or web address]  Expires: 2017  6:30 AM     Your access code will  in 90 days. If you need help or a new code, please call your [place] clinic or [phone].        Care EveryWhere ID     This is your Care EveryWhere ID. This could be used by other organizations to access your [place] medical records  [id number]        Your Vitals Were     Pulse Temperature Respirations Height Pulse Oximetry BMI (Body Mass Index)    62 97.6  F (36.4  C) (Oral) 16 1.778 m (5' 10\") 100% 23.92 kg/m2       Blood Pressure from Last 3 Encounters:   10/16/17 (!) 156/92   17 141/89   17 128/76    Weight from Last 3 Encounters:   10/16/17 75.6 kg (166 lb 11.2 oz)   17 72.6 kg (160 lb 1.6 oz)   07/10/17 73.4 kg (161 lb 12.8 oz)               Primary Care Provider Office Phone # Fax #    R Jean Carlos Jaimes [phone] [address] [address]        Equal Access to Services     [facility] AH: Shraddha Michel, sandro everett, vicente man. So " [facility] [phone].    ATENCIÓN: Si aravind martino, tiene a watreman disposición servicios gratuitos de asistencia lingüística. Angelica aceves [phone].    We comply with applicable federal civil rights laws and [place] laws. We do not discriminate on the basis of race, color, national origin, age, disability, sex, sexual orientation, or gender identity.            Thank you!     Thank you for choosing [facility]  for your care. Our goal is always to provide you with excellent care. Hearing back from our patients is one way we can continue to improve our services. Please take a few minutes to complete the written survey that you may receive in the mail after your visit with us. Thank you!             Your Updated Medication List - Protect others around you: Learn how to safely use, store and throw away your medicines at www.disposemymeds.org.          This list is accurate as of: 10/16/17 10:50 AM.  Always use your most recent med list.                   Brand Name Dispense Instructions for use Diagnosis    ALEVE 220 MG capsule   Generic drug:  naproxen sodium      Take 250 mg by mouth 2 times daily as needed        atropine 1 % ophthalmic solution     5 mL    Apply 1 drop to eye At Bedtime Instill into the operative eye(s) as directed per physician instructions.    Choroidal malignant melanoma, left (H)       brimonidine 0.2 % ophthalmic solution    ALPHAGAN     Place 1 drop Into the left eye 2 times daily        dorzolamide-timolol 2-0.5 % ophthalmic solution    COSOPT     Place 1 drop Into the left eye 2 times daily        HYDROcodone-acetaminophen 5-325 MG per tablet    NORCO     Take 1 tablet by mouth every 6 hours as needed for moderate to severe pain        prednisoLONE acetate 1 % ophthalmic susp    PRED FORTE    10 mL    Apply 1 drop to eye 4 times daily Instill into operative eye(s) per physician instructions.    Choroidal malignant melanoma, left (H)       UNABLE TO FIND      Place 1 drop Into  the left eye 2 times daily Pt is taking anti-pressure eyedrops, but does not remember the name.

## 2017-12-12 DIAGNOSIS — I73.9 PERIPHERAL VASCULAR DISEASE (H): ICD-10-CM

## 2017-12-12 DIAGNOSIS — C69.30 CHOROIDAL MALIGNANT MELANOMA (H): ICD-10-CM

## 2017-12-12 LAB
ALBUMIN SERPL-MCNC: 4.2 G/DL (ref 3.4–5)
ALP SERPL-CCNC: 59 U/L (ref 40–150)
ALT SERPL W P-5'-P-CCNC: 23 U/L (ref 0–70)
ANION GAP SERPL CALCULATED.3IONS-SCNC: 7 MMOL/L (ref 3–14)
AST SERPL W P-5'-P-CCNC: 17 U/L (ref 0–45)
BASOPHILS # BLD AUTO: 0 10E9/L (ref 0–0.2)
BASOPHILS NFR BLD AUTO: 0.6 %
BILIRUB SERPL-MCNC: 0.5 MG/DL (ref 0.2–1.3)
BUN SERPL-MCNC: 14 MG/DL (ref 7–30)
CALCIUM SERPL-MCNC: 8.9 MG/DL (ref 8.5–10.1)
CHLORIDE SERPL-SCNC: 104 MMOL/L (ref 94–109)
CO2 SERPL-SCNC: 28 MMOL/L (ref 20–32)
CREAT SERPL-MCNC: 0.98 MG/DL (ref 0.66–1.25)
DIFFERENTIAL METHOD BLD: NORMAL
EOSINOPHIL # BLD AUTO: 0.2 10E9/L (ref 0–0.7)
EOSINOPHIL NFR BLD AUTO: 2.6 %
ERYTHROCYTE [DISTWIDTH] IN BLOOD BY AUTOMATED COUNT: 13.4 % (ref 10–15)
GFR SERPL CREATININE-BSD FRML MDRD: 77 ML/MIN/1.7M2
GLUCOSE SERPL-MCNC: 85 MG/DL (ref 70–99)
HCT VFR BLD AUTO: 49.6 % (ref 40–53)
HGB BLD-MCNC: 16.6 G/DL (ref 13.3–17.7)
LYMPHOCYTES # BLD AUTO: 2.3 10E9/L (ref 0.8–5.3)
LYMPHOCYTES NFR BLD AUTO: 34.1 %
MAGNESIUM SERPL-MCNC: 2.3 MG/DL (ref 1.6–2.3)
MCH RBC QN AUTO: 32.2 PG (ref 26.5–33)
MCHC RBC AUTO-ENTMCNC: 33.5 G/DL (ref 31.5–36.5)
MCV RBC AUTO: 96 FL (ref 78–100)
MONOCYTES # BLD AUTO: 0.6 10E9/L (ref 0–1.3)
MONOCYTES NFR BLD AUTO: 9.2 %
NEUTROPHILS # BLD AUTO: 3.5 10E9/L (ref 1.6–8.3)
NEUTROPHILS NFR BLD AUTO: 53.5 %
PLATELET # BLD AUTO: 234 10E9/L (ref 150–450)
POTASSIUM SERPL-SCNC: 4.8 MMOL/L (ref 3.4–5.3)
PROT SERPL-MCNC: 7.2 G/DL (ref 6.8–8.8)
RBC # BLD AUTO: 5.16 10E12/L (ref 4.4–5.9)
SODIUM SERPL-SCNC: 139 MMOL/L (ref 133–144)
WBC # BLD AUTO: 6.6 10E9/L (ref 4–11)

## 2017-12-12 PROCEDURE — 83735 ASSAY OF MAGNESIUM: CPT | Performed by: INTERNAL MEDICINE

## 2017-12-12 PROCEDURE — 80053 COMPREHEN METABOLIC PANEL: CPT | Performed by: INTERNAL MEDICINE

## 2017-12-12 PROCEDURE — 85025 COMPLETE CBC W/AUTO DIFF WBC: CPT | Performed by: INTERNAL MEDICINE

## 2017-12-12 PROCEDURE — 36415 COLL VENOUS BLD VENIPUNCTURE: CPT | Performed by: INTERNAL MEDICINE

## 2017-12-12 RX ORDER — IOPAMIDOL 755 MG/ML
81 INJECTION, SOLUTION INTRAVASCULAR ONCE
Status: COMPLETED | OUTPATIENT
Start: 2017-12-13 | End: 2017-12-13

## 2017-12-13 ENCOUNTER — HOSPITAL ENCOUNTER (OUTPATIENT)
Dept: CT IMAGING | Facility: CLINIC | Age: 67
Discharge: HOME | End: 2017-12-13
Attending: INTERNAL MEDICINE | Admitting: INTERNAL MEDICINE
Payer: MEDICARE

## 2017-12-13 DIAGNOSIS — C69.42 MALIGNANT MELANOMA OF UVEA OF LEFT EYE (H): ICD-10-CM

## 2017-12-13 PROCEDURE — 25000128 H RX IP 250 OP 636: Performed by: RADIOLOGY

## 2017-12-13 PROCEDURE — 25000125 ZZHC RX 250: Performed by: RADIOLOGY

## 2017-12-13 PROCEDURE — 71260 CT THORAX DX C+: CPT

## 2017-12-13 PROCEDURE — 74177 CT ABD & PELVIS W/CONTRAST: CPT

## 2017-12-13 RX ADMIN — IOPAMIDOL 81 ML: 755 INJECTION, SOLUTION INTRAVENOUS at 10:42

## 2017-12-13 RX ADMIN — SODIUM CHLORIDE 60 ML: 9 INJECTION, SOLUTION INTRAVENOUS at 10:42

## 2017-12-18 ENCOUNTER — ONCOLOGY VISIT (OUTPATIENT)
Dept: ONCOLOGY | Facility: CLINIC | Age: 67
End: 2017-12-18
Attending: INTERNAL MEDICINE
Payer: COMMERCIAL

## 2017-12-18 VITALS
WEIGHT: 168 LBS | DIASTOLIC BLOOD PRESSURE: 86 MMHG | SYSTOLIC BLOOD PRESSURE: 135 MMHG | HEIGHT: 70 IN | HEART RATE: 67 BPM | BODY MASS INDEX: 24.05 KG/M2 | RESPIRATION RATE: 18 BRPM | OXYGEN SATURATION: 99 % | TEMPERATURE: 95.9 F

## 2017-12-18 DIAGNOSIS — C69.32 CHOROID MELANOMA OF LEFT EYE (H): Primary | ICD-10-CM

## 2017-12-18 PROCEDURE — 99213 OFFICE O/P EST LOW 20 MIN: CPT | Mod: ZF

## 2017-12-18 PROCEDURE — 99214 OFFICE O/P EST MOD 30 MIN: CPT | Mod: ZP | Performed by: INTERNAL MEDICINE

## 2017-12-18 ASSESSMENT — PAIN SCALES - GENERAL: PAINLEVEL: SEVERE PAIN (6)

## 2017-12-18 NOTE — MR AVS SNAPSHOT
"              After Visit Summary   2017    Jake Araiza    MRN: [id number]           Patient Information     Date Of Birth          1950        Visit Information        Provider Department      2017 9:30 AM Asa Ford MD [facility]        Today's Diagnoses     Choroid melanoma of left eye (H)    -  1       Follow-ups after your visit        Who to contact     If you have questions or need follow up information about today's clinic visit or your schedule please contact [facility] directly at [phone].  Normal or non-critical lab and imaging results will be communicated to you by Biocontrolhart, letter or phone within 4 business days after the clinic has received the results. If you do not hear from us within 7 days, please contact the clinic through Biocontrolhart or phone. If you have a critical or abnormal lab result, we will notify you by phone as soon as possible.  Submit refill requests through [email or web address] or call your pharmacy and they will forward the refill request to us. Please allow 3 business days for your refill to be completed.          Additional Information About Your Visit        MyChart Information     [email or web address] lets you send messages to your doctor, view your test results, renew your prescriptions, schedule appointments and more. To sign up, go to [email or web address] . Click on \"Log in\" on the left side of the screen, which will take you to the Welcome page. Then click on \"Sign up Now\" on the right side of the page.     You will be asked to enter the access code listed below, as well as some personal information. Please follow the directions to create your username and password.     Your access code is: [email or web address]  Expires: 2017  5:30 AM     Your access code will  in 90 days. If you need help or a new code, please call your [facility] clinic or [phone].        Care EveryWhere ID     This is your Care EveryWhere ID. This " "could be used by other organizations to access your [facility] medical records  [id number]        Your Vitals Were     Pulse Temperature Respirations Height Pulse Oximetry BMI (Body Mass Index)    67 95.9  F (35.5  C) (Tympanic) 18 1.778 m (5' 10\") 99% 24.11 kg/m2       Blood Pressure from Last 3 Encounters:   12/18/17 135/86   10/16/17 (!) 156/92   08/28/17 141/89    Weight from Last 3 Encounters:   12/18/17 76.2 kg (168 lb)   10/16/17 75.6 kg (166 lb 11.2 oz)   08/23/17 72.6 kg (160 lb 1.6 oz)              Today, you had the following     No orders found for display       Primary Care Provider Office Phone # Fax #    R Jean Carlos Jaimes [phone] [address] [address]        Equal Access to Services     VIOLETTA CORREA : Hadii tracy busch hadasho Soomaali, waaxda luqadaha, qaybta kaalmada adeegyada, vicente noel . So [facility] [phone].    ATENCIÓN: Si aravind martino, tiene a waterman disposición servicios gratuitos de asistencia lingüística. Llame al [phone].    We comply with applicable federal civil rights laws and [place] laws. We do not discriminate on the basis of race, color, national origin, age, disability, sex, sexual orientation, or gender identity.            Thank you!     Thank you for choosing [facility]  for your care. Our goal is always to provide you with excellent care. Hearing back from our patients is one way we can continue to improve our services. Please take a few minutes to complete the written survey that you may receive in the mail after your visit with us. Thank you!             Your Updated Medication List - Protect others around you: Learn how to safely use, store and throw away your medicines at www.disposemymeds.org.          This list is accurate as of: 12/18/17  9:42 AM.  Always use your most recent med list.                   Brand Name Dispense Instructions for use Diagnosis    ALEVE 220 MG capsule "   Generic drug:  naproxen sodium      Take 250 mg by mouth 2 times daily as needed        amLODIPine 1 mg/mL Susp    NORVASC     Take 5 mg by mouth daily        atropine 1 % ophthalmic solution     5 mL    Apply 1 drop to eye At Bedtime Instill into the operative eye(s) as directed per physician instructions.    Choroidal malignant melanoma, left (H)       brimonidine 0.2 % ophthalmic solution    ALPHAGAN     Place 1 drop Into the left eye 2 times daily        dorzolamide-timolol 2-0.5 % ophthalmic solution    COSOPT     Place 1 drop Into the left eye 2 times daily        HYDROcodone-acetaminophen 5-325 MG per tablet    NORCO     Take 1 tablet by mouth every 6 hours as needed for moderate to severe pain        prednisoLONE acetate 1 % ophthalmic susp    PRED FORTE    10 mL    Apply 1 drop to eye 4 times daily Instill into operative eye(s) per physician instructions.    Choroidal malignant melanoma, left (H)       UNABLE TO FIND      Place 1 drop Into the left eye 2 times daily Pt is taking anti-pressure eyedrops, but does not remember the name.

## 2017-12-18 NOTE — NURSING NOTE
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    Weight as of this encounter: 76.2 kg (168 lb). Body surface area is 1.94 meters squared.  Severe Pain (6) Comment: Left Toe   No LMP for male patient.  Allergies reviewed: Yes  Medications reviewed: Yes    Medications: Medication refills not needed today.  Pharmacy name entered into [employer]: [facility] [place], [place] - [facility] [address]    Clinical concerns: No new concerns. Provider was notified.    10 minutes for nursing intake (face to face time)     Carri Kay LPN            "

## 2017-12-18 NOTE — LETTER
12/18/2017        RE: Jake Araiza  [address] [address] [place] [id number]     Dear Colleague,    Thank you for referring your patient, Jake Araiza, to the [facility]. Please see a copy of my visit note below.    ONCOLOGY PROGRESS NOTE  Dec 18, 2017     Oncologic History:  1. Seen at [facility], under Dr. Luna and Dr. Garsia in radiation oncology.  2  7/8/17, MRI brain and MRI liver are negative for evidence of metastatic disease.  3. 8/24/17, he has plaque brachytherapy to the left eye    HISTORY OF PRESENT ILLNESS  Mr. 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 He remains asymptomatic. He reports stable blurred vision in the left eye. No flashes or floaters. No vision loss, pain or pressure. No loss of appetite, weight loss, abdominal pain, nausea, vomiting, or diarrhea.    He had CT-CAP and that was negative for evidence of metastatic disease. Labs including complete blood count, renal function, and liver function tests are normal. Performance status is 0.    Current Outpatient Prescriptions   Medication Sig Dispense Refill     HYDROcodone-acetaminophen (NORCO) 5-325 MG per tablet Take 1 tablet by mouth every 6 hours as needed for moderate to severe pain       atropine 1 % ophthalmic solution Apply 1 drop to eye At Bedtime Instill into the operative eye(s) as directed per physician instructions. (Patient not taking: Reported on 10/16/2017) 5 mL 0     prednisoLONE acetate (PRED FORTE) 1 % ophthalmic susp Apply 1 drop to eye 4 times daily Instill into operative eye(s) per physician instructions. 10 mL 0     brimonidine (ALPHAGAN) 0.2 % ophthalmic solution Place 1 drop Into the left eye 2 times daily       dorzolamide-timolol (COSOPT) 2-0.5 % ophthalmic solution Place 1 drop Into the left eye 2 times daily       UNABLE TO FIND Place 1 drop Into the left eye 2 times daily Pt is taking anti-pressure eyedrops, but does not remember  the name.       naproxen sodium (ALEVE) 220 MG capsule Take 250 mg by mouth 2 times daily as needed          REVIEW OF SYSTEMS  12-point ROS negative except as in HPI    PHYSICAL EXAMINATION  There were no vitals taken for this visit.  Wt Readings from Last 2 Encounters:   10/16/17 75.6 kg (166 lb 11.2 oz)   08/23/17 72.6 kg (160 lb 1.6 oz)     Physical Exam   Constitutional: He is oriented to person, place, and time. He appears well-developed and well-nourished. No distress.   HENT:   Mouth/Throat: Oropharynx is clear and moist.   Eyes: EOM are normal. Pupils are equal, round, and reactive to light. No scleral icterus.   Left lid droop   Pulmonary/Chest: Effort normal.   Musculoskeletal: Normal range of motion.   Neurological: He is alert and oriented to person, place, and time.   Skin: Skin is warm and dry. No erythema.   Psychiatric: He has a normal mood and affect. His behavior is normal.   Nursing note and vitals reviewed.      IMPRESSION AND PLAN  #1 Choroidal melanoma, left eye  Mr. Araiza presents today for follow-up and restaging studies. He is doing well. We reviewed the CT, which is negative for evidence of metastatic disease. Would continue to recommend observation scans every 3 to 4 months. He understands that observation imaging will be needed over the next 3-5 years to ensure absence of metastasis.    Patient would like for us to send CD with imaging Dr. Frieda Herbert at [facility]. Questions answered.      Asa Fish M.D.   of Medicine  Hematology, Oncology and Transplantation  [facility]

## 2017-12-18 NOTE — PROGRESS NOTES
ONCOLOGY PROGRESS NOTE  Dec 18, 2017     Oncologic History:  1. Seen at [facility] [place], under Dr. Luna and Dr. Garsia in radiation oncology.  2  7/8/17, MRI brain and MRI liver are negative for evidence of metastatic disease.  3. 8/24/17, he has plaque brachytherapy to the left eye    HISTORY OF PRESENT ILLNESS  Mr. 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 He remains asymptomatic. He reports stable blurred vision in the left eye. No flashes or floaters. No vision loss, pain or pressure. No loss of appetite, weight loss, abdominal pain, nausea, vomiting, or diarrhea.    He had CT-CAP and that was negative for evidence of metastatic disease. Labs including complete blood count, renal function, and liver function tests are normal. Performance status is 0.    Current Outpatient Prescriptions   Medication Sig Dispense Refill     HYDROcodone-acetaminophen (NORCO) 5-325 MG per tablet Take 1 tablet by mouth every 6 hours as needed for moderate to severe pain       atropine 1 % ophthalmic solution Apply 1 drop to eye At Bedtime Instill into the operative eye(s) as directed per physician instructions. (Patient not taking: Reported on 10/16/2017) 5 mL 0     prednisoLONE acetate (PRED FORTE) 1 % ophthalmic susp Apply 1 drop to eye 4 times daily Instill into operative eye(s) per physician instructions. 10 mL 0     brimonidine (ALPHAGAN) 0.2 % ophthalmic solution Place 1 drop Into the left eye 2 times daily       dorzolamide-timolol (COSOPT) 2-0.5 % ophthalmic solution Place 1 drop Into the left eye 2 times daily       UNABLE TO FIND Place 1 drop Into the left eye 2 times daily Pt is taking anti-pressure eyedrops, but does not remember the name.       naproxen sodium (ALEVE) 220 MG capsule Take 250 mg by mouth 2 times daily as needed          REVIEW OF SYSTEMS  12-point ROS negative except as in HPI    PHYSICAL EXAMINATION  There were no vitals taken for this visit.  Wt  Readings from Last 2 Encounters:   10/16/17 75.6 kg (166 lb 11.2 oz)   08/23/17 72.6 kg (160 lb 1.6 oz)     Physical Exam   Constitutional: He is oriented to person, place, and time. He appears well-developed and well-nourished. No distress.   HENT:   Mouth/Throat: Oropharynx is clear and moist.   Eyes: EOM are normal. Pupils are equal, round, and reactive to light. No scleral icterus.   Left lid droop   Pulmonary/Chest: Effort normal.   Musculoskeletal: Normal range of motion.   Neurological: He is alert and oriented to person, place, and time.   Skin: Skin is warm and dry. No erythema.   Psychiatric: He has a normal mood and affect. His behavior is normal.   Nursing note and vitals reviewed.      IMPRESSION AND PLAN  #1 Choroidal melanoma, left eye  Mr. Araiza presents today for follow-up and restaging studies. He is doing well. We reviewed the CT, which is negative for evidence of metastatic disease. Would continue to recommend observation scans every 3 to 4 months. He understands that observation imaging will be needed over the next 3-5 years to ensure absence of metastasis.    Patient would like for us to send CD with imaging [facility]. Questions answered.      Asa Fish M.D.   of Medicine  Hematology, Oncology and Transplantation  [facility]

## 2018-04-02 ENCOUNTER — TRANSFERRED RECORDS (OUTPATIENT)
Dept: HEALTH INFORMATION MANAGEMENT | Facility: CLINIC | Age: 68
End: 2018-04-02

## 2018-04-17 ENCOUNTER — TRANSFERRED RECORDS (OUTPATIENT)
Dept: HEALTH INFORMATION MANAGEMENT | Facility: CLINIC | Age: 68
End: 2018-04-17

## 2018-04-26 ENCOUNTER — TRANSFERRED RECORDS (OUTPATIENT)
Dept: HEALTH INFORMATION MANAGEMENT | Facility: CLINIC | Age: 68
End: 2018-04-26

## 2018-05-07 ENCOUNTER — HOSPITAL ENCOUNTER (OUTPATIENT)
Dept: PHYSICAL THERAPY | Facility: CLINIC | Age: 68
Setting detail: THERAPIES SERIES
End: 2018-05-07
Attending: PHYSICIAN ASSISTANT
Payer: COMMERCIAL

## 2018-05-07 PROCEDURE — 97110 THERAPEUTIC EXERCISES: CPT | Mod: GP | Performed by: PHYSICAL THERAPIST

## 2018-05-07 PROCEDURE — 97162 PT EVAL MOD COMPLEX 30 MIN: CPT | Mod: GP | Performed by: PHYSICAL THERAPIST

## 2018-05-07 PROCEDURE — 40000718 ZZHC STATISTIC PT DEPARTMENT ORTHO VISIT: Performed by: PHYSICAL THERAPIST

## 2018-05-07 NOTE — PROGRESS NOTES
" 05/07/18 0700   General Information   Type of Visit Initial OP Ortho PT Evaluation   Start of Care Date 05/07/18   Referring Physician Dr. Yuli Perez   Patient/Family Goals Statement Less pain   Orders Evaluate and Treat   Date of Order 04/26/18   Insurance Type Other  ([facility] choice)   Medical Diagnosis LBP/ R shoulder pain   Body Part(s)   Body Part(s) Lumbar Spine/SI;Shoulder   Presentation and Etiology   Pertinent history of current problem (include personal factors and/or comorbidities that impact the POC) Pt presents w/ R shoulder pain X 1 month.  No speciific injury--woke up w/ soreness.  Pt notes intermittent pain 7/10.  No neck pain.  Pt notes the pain can radiate down to the elbow.  Pt states it feels weaker.  Xray mild arthritis.  Meds: naproxen.    Pt is R dominant.  LBP:  chronic issue X 40 years and he feels it is getting worse.  Xrays: degenerative discs.  Pt notes it has always been midline but now moving across LB R> L.  No LE symptoms/ numbness/ tingling.  Pain @ best 7/10,  @ worst 10/10.  No LE weakness. Slight increase w/ cough/ sneeze.   PMHX:  ocular melanoma, arthritis,   current smoker.   L shoulder decompression.  Pt notes in the past he did see chiro w/ some benefit.   Moderate : complexity of case, smoking   Impairments A. Pain;D. Decreased ROM;E. Decreased flexibility;F. Decreased strength and endurance   Pain quality C. Aching   Pain exacerbation comment R shoulder:  sleeping on R side--will wake him,  reaching overhead especially if he has to hold the position 30\" (then B shoulders will burn).  Reaching to wash his back.  LB: bending forward.  Standing > 10-15 min.  Sitting > 15 min.   Wakes him hourly but notes w/ change of position he is able to go back to sleep.   Initial steps w/ walking.   Unable to do yardwork   Pain/symptoms eased by F. Certain positions;G. Heat;H. Cold;I. OTC medication(s);J. Braces/supports;E. Changing positions   Progression of symptoms since onset: " Worsened  (increasing LB symptoms over time, shoulder improving)   Prior Level of Function   Functional Level Prior Comment Pt and spouse walk at most 1 mile 2X/wk.     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 No   Have you fallen and had an injury in the past year? No   Is patient a fall risk? No   Lumbar Spine/SI Objective Findings   Posture R PSIS/ crest lower.  Decreased lumbar lordosis.  L LE longer supine.     Gait/Locomotion no limp, slower pace.  Heel/toe negative--however pt notes his balance is not the best.     Flexion ROM 10%    Extension ROM 5%    Right Side Bending ROM 20%   Left Side Bending ROM 10%   Pelvic Screen Pt unable to bend fwd to test FB w/ SI testing.     Hip Screen tightness w/ hip testing B,  some pain w/ R hip w/ FADIR and ABDIRAHMAN   Hip Flexion (L2) Strength B 4+/5   Hip Abduction Strength B 5/5   Knee Flexion Strength B 5/5   Knee Extension (L3) Strength B 5/5   Ankle Dorsiflexion (L4) Strength B 5/5   Great Toe Extension (L5) Strength B 5/5   Hamstring Flexibility mod to mod + tightness B   SLR notes some LBP R > L    Slump Test Neg B   Segmental Mobility significant hypomobility thru lumbar spine.     Palpation A little tenderness R lumbar paraspinals and L QL.  Mild discomfort B psoas   Shoulder Objective Findings   Side (if bilateral, select both right and left) Right   Posture FH,  increased prominance of shoulder blades L > R    Cervical Screen (ROM, quadrant) CROM  flex / ext/ B rot WFL no complaints   Scapulothoracic Rhythm mild decreased scap control B    Pec Minor (supine) Flexibility mild to mod pec tightness   Neer's Test neg   Wright-Mohamud Test neg   Coracoid Test slight pain   [place]'s Test Neg   Crossover Test slight pain   Shoulder Special Tests Comments lift off neg,  empty  can mild pain  4+/5 strength    Palpation slight pain in R UT and R biceps otherwise neg   Accessory Motion/Joint Mobility Mild GH hypomobility   Right Shoulder Flexion AROM R 136* notes it is tight,  L 145*   Right Shoulder Flexion PROM R 160* w/ pain noted   Right Shoulder Abduction AROM R 120*,  L 145*   Right Shoulder Abduction PROM R 168*   Right Shoulder ER AROM R 75*, L 57*   Right Shoulder ER PROM WNL   Right Shoulder IR AROM R to T 10,  L to T6   Right Shoulder IR PROM 40* in 75* abd   Right Shoulder Flexion Strength 4+/5   Right Shoulder Abduction Strength 4/5 w/ pain noted    Right Shoulder ER Strength 5/5   Right Shoulder IR Strength 5-/5   Planned Therapy Interventions   Planned Therapy Interventions manual therapy;neuromuscular re-education;ROM;strengthening;stretching   Clinical Impression   Criteria for Skilled Therapeutic Interventions Met yes, treatment indicated   PT Diagnosis R shoulder pain, chronic LBP   Influenced by the following impairments pain, decreased mobility, decreased strength   Functional limitations due to impairments sleeping, bending, reaching   Clinical Presentation Evolving/Changing   Clinical Presentation Rationale recent onset of R shoulder symptoms, increasing LBP   Clinical Decision Making (Complexity) Moderate complexity   Therapy Frequency 1 time/week   Predicted Duration of Therapy Intervention (days/wks) 6 weeks   Risk & Benefits of therapy have been explained Yes   Patient, Family & other staff in agreement with plan of care Yes   Education Assessment   Barriers to Learning No barriers   Ortho Goal 1   Goal Description 1.  Pt will be able to reach overhead w/ADL's w/ R shoulder pain no > 3/10   Target Date 06/06/18   Ortho Goal 2   Goal Description 2.  Pt will be able to wash his back w/ R shoulder  pain  no > 3/10   Target Date 06/06/18   Ortho Goal 3   Goal Description 3.  Pt will be able to sleep on R side X 30 min    Target Date 06/26/18   Ortho Goal 4   Goal Description 4.  Pt will have  increased trunk flexion to 40% to improve his daily function   Target Date 06/26/18   Ortho Goal 5   Goal Description 5. Pt will be independent and consistent w/HEP and walk 3/4 mile 3-4X/wk   Target Date 06/26/18   Total Evaluation Time   Total Evaluation Time 40     Thank you for this referral,    Kamala Victor, PT,  CEAS   #[id number]  [facility]  [phone]

## 2018-05-14 ENCOUNTER — HOSPITAL ENCOUNTER (OUTPATIENT)
Dept: PHYSICAL THERAPY | Facility: CLINIC | Age: 68
Setting detail: THERAPIES SERIES
End: 2018-05-14
Attending: PHYSICIAN ASSISTANT
Payer: COMMERCIAL

## 2018-05-14 PROCEDURE — 97110 THERAPEUTIC EXERCISES: CPT | Mod: GP | Performed by: PHYSICAL THERAPIST

## 2018-05-14 PROCEDURE — 97140 MANUAL THERAPY 1/> REGIONS: CPT | Mod: GP | Performed by: PHYSICAL THERAPIST

## 2018-05-14 PROCEDURE — 40000718 ZZHC STATISTIC PT DEPARTMENT ORTHO VISIT: Performed by: PHYSICAL THERAPIST

## 2018-05-21 ENCOUNTER — HOSPITAL ENCOUNTER (OUTPATIENT)
Dept: PHYSICAL THERAPY | Facility: CLINIC | Age: 68
Setting detail: THERAPIES SERIES
End: 2018-05-21
Attending: PHYSICIAN ASSISTANT
Payer: COMMERCIAL

## 2018-05-21 PROCEDURE — 97140 MANUAL THERAPY 1/> REGIONS: CPT | Mod: GP | Performed by: PHYSICAL THERAPIST

## 2018-05-21 PROCEDURE — 97110 THERAPEUTIC EXERCISES: CPT | Mod: GP | Performed by: PHYSICAL THERAPIST

## 2018-05-21 PROCEDURE — 40000718 ZZHC STATISTIC PT DEPARTMENT ORTHO VISIT: Performed by: PHYSICAL THERAPIST

## 2018-05-29 ENCOUNTER — HOSPITAL ENCOUNTER (OUTPATIENT)
Dept: PHYSICAL THERAPY | Facility: CLINIC | Age: 68
Setting detail: THERAPIES SERIES
End: 2018-05-29
Attending: PHYSICIAN ASSISTANT
Payer: COMMERCIAL

## 2018-05-29 PROCEDURE — 40000718 ZZHC STATISTIC PT DEPARTMENT ORTHO VISIT: Performed by: PHYSICAL THERAPIST

## 2018-05-29 PROCEDURE — 97110 THERAPEUTIC EXERCISES: CPT | Mod: GP | Performed by: PHYSICAL THERAPIST

## 2018-05-29 PROCEDURE — 97140 MANUAL THERAPY 1/> REGIONS: CPT | Mod: GP | Performed by: PHYSICAL THERAPIST

## 2018-05-29 NOTE — PROGRESS NOTES
OUTPATIENT PHYSICAL THERAPY PROGRESS NOTE   Dr. Yuli Perez 5/7/18 to 05/29/18 0700   Signing Clinician's Name / Credentials   Signing clinician's name / credentials Kamalatai Victor, PT [id number]   Session Number   Session Number 4 [facility] choice   Ortho Goal 1   Goal Description 1.  Pt will be able to reach overhead w/ADL's w/ R shoulder pain no > 3/10.  5/21/18  no complaints MET   Target Date 06/06/18   Ortho Goal 2   Goal Description 2.  Pt will be able to wash his back w/ R shoulder  pain  no > 3/10.   5/21/18 cont to note decreased mobility but no pain..   5/29/18 MET   Target Date 06/06/18   Ortho Goal 3   Goal Description 3.  Pt will be able to sleep on R side X 30 min .  5/29/18 tries to avoid it but notes he normally rolls all night long   Target Date 06/26/18   Ortho Goal 4   Goal Description 4.  Pt will have increased trunk flexion to 40% to improve his daily function.  5/29/18 ongoing limitations.   Target Date 06/26/18   Ortho Goal 5   Goal Description 5. Pt will be independent and consistent w/HEP and walk 3/4 mile 3-4X/wk.  5/29/18  Pt is doing HEP but has not been walking consistently.    Target Date 06/26/18   Subjective Report   Subjective Report Pt reports shoulder seems to be doing really well.  Only bothers if he sleeps on it wrong.    No change w/ LB--motion remains limited and pain level 7/10.   Pt forgot to bring in his ex sheets.  Pt did not look into pool for ex.    Objective Measures   Objective Measure AROM R shoulder flex 164*;  abd 167*,  ER 86*,  IR to T8   Details MMT:  R shoulder flex / ER / IR 5/5,   abd 5-/5 w/ tightness noted   Objective Measure R PSIS/ crest slightly lower.  Hard to assess FB test due to significant decreased motion.  L LE slightly longer in supine.     Details LROM:  flex 30% w/ increased pain,ext 5%,  SB R 40%, L 30%.  PPU's 20% of full motion   Therapeutic Procedure/exercise   Patient Response LT set on own.  Ongoing tightness w/ post capsule stretch.   After ex and  "MT LROM flex 40%,  SB B 50%. ext 5%   Treatment Detail Shoulder:   Pendulum circles.   Wall flex and abd X 5 each.    Post capusle stretch in SL 30\" x 2. Shoulder flex and scaption 1/2# x 25 each.  ER in SL w/ LT set 1/2# X 20 (cuing to set shoulder blade).   GTB shoulder ext w/ LT set X 30 (improved technique).   LB:   PPU.  LTRS,    DKTC.   Piriformis > 90* B.   supine TA set w/ slight tilt and marching X 15 B.    Clams X 15 B.  Seated HS stretch B.   Seated TA sets.     Cat/ cow.      Manual Therapy   Treatment Detail shotgun,  MET for R post rotation.  Grade II to III PA to L1 to L5.( very tight)   Plan   Home program ex as above   Plan Pt to cont on own w/ shoulder as he is doing well.   Pt to recheck w/ MD re: LB due to no significant change in function/ pain level.   Chart will be open X 30 days.    Comments   Comments Progress towards goals as noted above.      "

## 2018-06-27 NOTE — PROGRESS NOTES
OUTPATIENT PHYSICAL THERAPY DISCHARGE SUMMARY   Dr. Yuli Perez 5/7/18 to 05/29/18 0700   Signing Clinician's Name / Credentials   Signing clinician's name / credentials Kamalatai Victor, PT [id number]   Session Number   Session Number 4 [facility]   Ortho Goal 1   Goal Description 1.  Pt will be able to reach overhead w/ADL's w/ R shoulder pain no > 3/10.  5/21/18  no complaints MET   Target Date 06/06/18   Ortho Goal 2   Goal Description 2.  Pt will be able to wash his back w/ R shoulder  pain  no > 3/10.   5/21/18 cont to note decreased mobility but no pain..   5/29/18 MET   Target Date 06/06/18   Ortho Goal 3   Goal Description 3.  Pt will be able to sleep on R side X 30 min .  5/29/18 tries to avoid it but notes he normally rolls all night long   Target Date 06/26/18   Ortho Goal 4   Goal Description 4.  Pt will have increased trunk flexion to 40% to improve his daily function.  5/29/18 ongoing limitations.   Target Date 06/26/18   Ortho Goal 5   Goal Description 5. Pt will be independent and consistent w/HEP and walk 3/4 mile 3-4X/wk.  5/29/18  Pt is doing HEP but has not been walking consistently.    Target Date 06/26/18   Subjective Report   Subjective Report Pt reports shoulder seems to be doing really well.  Only bothers if he sleeps on it wrong.    No change w/ LB--motion remains limited and pain level 7/10.   Pt forgot to bring in his ex sheets.  Pt did not look into pool for ex.    Objective Measures   Objective Measure AROM R shoulder flex 164*;  abd 167*,  ER 86*,  IR to T8   Details MMT:  R shoulder flex / ER / IR 5/5,   abd 5-/5 w/ tightness noted   Objective Measure R PSIS/ crest slightly lower.  Hard to assess FB test due to significant decreased motion.  L LE slightly longer in supine.     Details LROM:  flex 30% w/ increased pain,ext 5%,  SB R 40%, L 30%.  PPU's 20% of full motion   Therapeutic Procedure/exercise   Patient Response LT set on own.  Ongoing tightness w/ post capsule stretch.   After ex  "and MT LROM flex 40%,  SB B 50%. ext 5%   Treatment Detail Shoulder:   Pendulum circles.   Wall flex and abd X 5 each.    Post capusle stretch in SL 30\" x 2. Shoulder flex and scaption 1/2# x 25 each.  ER in SL w/ LT set 1/2# X 20 (cuing to set shoulder blade).   GTB shoulder ext w/ LT set X 30 (improved technique).   LB:   PPU.  LTRS,    DKTC.   Piriformis > 90* B.   supine TA set w/ slight tilt and marching X 15 B.    Clams X 15 B.  Seated HS stretch B.   Seated TA sets.     Cat/ cow.      Manual Therapy   Treatment Detail shotgun,  MET for R post rotation.  Grade II to III PA to L1 to L5.( very tight)   Plan   Home program ex as above   Plan Pt to cont on own w/ shoulder as he is doing well.   Pt to recheck w/ MD re: LB due to no significant change in function/ pain level. Discharge from physical therapy.   Comments   Comments Progress towards goals as noted above.         "

## 2018-06-27 NOTE — ADDENDUM NOTE
Encounter addended by: Kamala Victor, PT on: 6/27/2018  2:15 PM<BR>     Actions taken: Sign clinical note, Flowsheet accepted, Episode resolved

## 2018-08-13 ENCOUNTER — TRANSFERRED RECORDS (OUTPATIENT)
Dept: HEALTH INFORMATION MANAGEMENT | Facility: CLINIC | Age: 68
End: 2018-08-13

## 2019-04-15 ENCOUNTER — TRANSFERRED RECORDS (OUTPATIENT)
Dept: HEALTH INFORMATION MANAGEMENT | Facility: CLINIC | Age: 69
End: 2019-04-15

## (undated) DEVICE — GLOVE PROTEXIS MICRO 6.5  2D73PM65

## (undated) DEVICE — GLOVE PROTEXIS MICRO 7.0  2D73PM70

## (undated) DEVICE — GLOVE PROTEXIS MICRO 8.0  2D73PM80

## (undated) DEVICE — LINEN TOWEL PACK X5 5464

## (undated) DEVICE — SU VICRYL 7-0 TG140-8DA 18" J546G

## (undated) DEVICE — SU SILK 2-0 TIE 18' A185H

## (undated) DEVICE — EYE NDL RETROBULBAR 25GA 1.5" 581275

## (undated) DEVICE — PACK VITRECTOMY PQ15VI57E

## (undated) DEVICE — SU MERSILENE 5-0 S-24 18" 1764G

## (undated) DEVICE — PEN MARKING SKIN

## (undated) DEVICE — SOL BENZOIN 0.5OZ

## (undated) DEVICE — PEN MARKING SKIN FINE 31145942

## (undated) DEVICE — Device

## (undated) RX ORDER — ATROPINE SULFATE 10 MG/ML
SOLUTION/ DROPS OPHTHALMIC
Status: DISPENSED
Start: 2017-08-24

## (undated) RX ORDER — DEXAMETHASONE SODIUM PHOSPHATE 4 MG/ML
INJECTION, SOLUTION INTRA-ARTICULAR; INTRALESIONAL; INTRAMUSCULAR; INTRAVENOUS; SOFT TISSUE
Status: DISPENSED
Start: 2017-08-24

## (undated) RX ORDER — PROPOFOL 10 MG/ML
INJECTION, EMULSION INTRAVENOUS
Status: DISPENSED
Start: 2017-08-24

## (undated) RX ORDER — PROPOFOL 10 MG/ML
INJECTION, EMULSION INTRAVENOUS
Status: DISPENSED
Start: 2017-08-28

## (undated) RX ORDER — BUPIVACAINE HYDROCHLORIDE 7.5 MG/ML
INJECTION, SOLUTION EPIDURAL; RETROBULBAR
Status: DISPENSED
Start: 2017-08-28

## (undated) RX ORDER — ONDANSETRON 2 MG/ML
INJECTION INTRAMUSCULAR; INTRAVENOUS
Status: DISPENSED
Start: 2017-08-28

## (undated) RX ORDER — CEFAZOLIN SODIUM 500 MG/2.2ML
INJECTION, POWDER, FOR SOLUTION INTRAMUSCULAR; INTRAVENOUS
Status: DISPENSED
Start: 2017-08-24

## (undated) RX ORDER — BALANCED SALT SOLUTION 6.4; .75; .48; .3; 3.9; 1.7 MG/ML; MG/ML; MG/ML; MG/ML; MG/ML; MG/ML
SOLUTION OPHTHALMIC
Status: DISPENSED
Start: 2017-08-24

## (undated) RX ORDER — NEOMYCIN SULFATE, POLYMYXIN B SULFATE AND GRAMICIDIN 1.75; 10000; .025 MG/ML; [USP'U]/ML; MG/ML
SOLUTION/ DROPS OPHTHALMIC
Status: DISPENSED
Start: 2017-08-24

## (undated) RX ORDER — DEXAMETHASONE SODIUM PHOSPHATE 4 MG/ML
INJECTION, SOLUTION INTRA-ARTICULAR; INTRALESIONAL; INTRAMUSCULAR; INTRAVENOUS; SOFT TISSUE
Status: DISPENSED
Start: 2017-08-28

## (undated) RX ORDER — FENTANYL CITRATE 50 UG/ML
INJECTION, SOLUTION INTRAMUSCULAR; INTRAVENOUS
Status: DISPENSED
Start: 2017-08-28

## (undated) RX ORDER — LIDOCAINE HYDROCHLORIDE 20 MG/ML
INJECTION, SOLUTION EPIDURAL; INFILTRATION; INTRACAUDAL; PERINEURAL
Status: DISPENSED
Start: 2017-08-24

## (undated) RX ORDER — FENTANYL CITRATE 50 UG/ML
INJECTION, SOLUTION INTRAMUSCULAR; INTRAVENOUS
Status: DISPENSED
Start: 2017-08-24

## (undated) RX ORDER — GLYCOPYRROLATE 0.2 MG/ML
INJECTION, SOLUTION INTRAMUSCULAR; INTRAVENOUS
Status: DISPENSED
Start: 2017-08-24

## (undated) RX ORDER — BUPIVACAINE HYDROCHLORIDE 7.5 MG/ML
INJECTION, SOLUTION EPIDURAL; RETROBULBAR
Status: DISPENSED
Start: 2017-08-24

## (undated) RX ORDER — NEOMYCIN SULFATE, POLYMYXIN B SULFATE, AND DEXAMETHASONE 3.5; 10000; 1 MG/G; [USP'U]/G; MG/G
OINTMENT OPHTHALMIC
Status: DISPENSED
Start: 2017-08-24

## (undated) RX ORDER — NEOMYCIN SULFATE, POLYMYXIN B SULFATE AND DEXAMETHASONE 3.5; 10000; 1 MG/ML; [USP'U]/ML; MG/ML
SUSPENSION/ DROPS OPHTHALMIC
Status: DISPENSED
Start: 2017-08-24